# Patient Record
Sex: FEMALE | Race: WHITE | NOT HISPANIC OR LATINO | Employment: FULL TIME | URBAN - METROPOLITAN AREA
[De-identification: names, ages, dates, MRNs, and addresses within clinical notes are randomized per-mention and may not be internally consistent; named-entity substitution may affect disease eponyms.]

---

## 2018-10-11 PROBLEM — Z95.828 PRESENCE OF IVC FILTER: Status: ACTIVE | Noted: 2018-10-11

## 2018-10-11 PROBLEM — N17.9 AKI (ACUTE KIDNEY INJURY): Status: ACTIVE | Noted: 2018-10-11

## 2018-10-11 PROBLEM — D64.9 SYMPTOMATIC ANEMIA: Status: ACTIVE | Noted: 2018-10-11

## 2018-10-11 PROBLEM — R79.89 POSITIVE D DIMER: Status: ACTIVE | Noted: 2018-10-11

## 2018-10-11 PROBLEM — Z86.711 HX PULMONARY EMBOLISM: Status: ACTIVE | Noted: 2018-10-11

## 2018-10-12 PROBLEM — I16.0 HYPERTENSIVE URGENCY: Status: ACTIVE | Noted: 2018-10-12

## 2018-10-12 PROBLEM — R60.0 BILATERAL LOWER EXTREMITY EDEMA: Status: ACTIVE | Noted: 2018-10-12

## 2018-10-13 PROBLEM — R60.0 BILATERAL LOWER EXTREMITY EDEMA: Status: RESOLVED | Noted: 2018-10-12 | Resolved: 2018-10-13

## 2018-10-13 PROBLEM — N17.9 AKI (ACUTE KIDNEY INJURY): Status: RESOLVED | Noted: 2018-10-11 | Resolved: 2018-10-13

## 2018-10-13 PROBLEM — D64.9 SYMPTOMATIC ANEMIA: Status: RESOLVED | Noted: 2018-10-11 | Resolved: 2018-10-13

## 2018-11-09 ENCOUNTER — OFFICE VISIT (OUTPATIENT)
Dept: PRIMARY CARE CLINIC | Facility: CLINIC | Age: 63
End: 2018-11-09
Payer: COMMERCIAL

## 2018-11-09 VITALS
OXYGEN SATURATION: 98 % | SYSTOLIC BLOOD PRESSURE: 126 MMHG | WEIGHT: 245 LBS | RESPIRATION RATE: 18 BRPM | HEIGHT: 62 IN | HEART RATE: 88 BPM | BODY MASS INDEX: 45.08 KG/M2 | DIASTOLIC BLOOD PRESSURE: 81 MMHG

## 2018-11-09 DIAGNOSIS — K59.00 CONSTIPATION, UNSPECIFIED CONSTIPATION TYPE: ICD-10-CM

## 2018-11-09 DIAGNOSIS — E53.8 FOLIC ACID DEFICIENCY: ICD-10-CM

## 2018-11-09 DIAGNOSIS — D64.9 ANEMIA, UNSPECIFIED TYPE: Primary | ICD-10-CM

## 2018-11-09 DIAGNOSIS — I10 HYPERTENSION, UNSPECIFIED TYPE: ICD-10-CM

## 2018-11-09 DIAGNOSIS — Z86.39 HISTORY OF IRON DEFICIENCY: ICD-10-CM

## 2018-11-09 DIAGNOSIS — E66.01 MORBID OBESITY WITH BMI OF 40.0-44.9, ADULT: ICD-10-CM

## 2018-11-09 PROCEDURE — 99999 PR PBB SHADOW E&M-NEW PATIENT-LVL IV: CPT | Mod: PBBFAC,,, | Performed by: FAMILY MEDICINE

## 2018-11-09 PROCEDURE — 3008F BODY MASS INDEX DOCD: CPT | Mod: CPTII,S$GLB,, | Performed by: FAMILY MEDICINE

## 2018-11-09 PROCEDURE — 99204 OFFICE O/P NEW MOD 45 MIN: CPT | Mod: S$GLB,,, | Performed by: FAMILY MEDICINE

## 2018-11-09 RX ORDER — FOLIC ACID 1 MG/1
1 TABLET ORAL DAILY
Qty: 100 TABLET | Refills: 3 | Status: SHIPPED | OUTPATIENT
Start: 2018-11-09 | End: 2019-11-09

## 2018-11-09 RX ORDER — TRAZODONE HYDROCHLORIDE 50 MG/1
50 TABLET ORAL NIGHTLY
Qty: 30 TABLET | Refills: 11 | Status: SHIPPED | OUTPATIENT
Start: 2018-11-09 | End: 2019-11-26 | Stop reason: SDUPTHER

## 2018-11-09 RX ORDER — LORAZEPAM 0.5 MG/1
TABLET ORAL
Qty: 30 TABLET | Refills: 2 | Status: SHIPPED | OUTPATIENT
Start: 2018-11-09

## 2018-11-09 RX ORDER — LACTULOSE 10 G/15ML
20 SOLUTION ORAL DAILY
Qty: 900 ML | Refills: 0 | Status: SHIPPED | OUTPATIENT
Start: 2018-11-09 | End: 2018-12-09

## 2018-11-09 NOTE — PROGRESS NOTES
Subjective:       Patient ID: Tami Herrera is a 63 y.o. female.    Chief Complaint: Establish Care and Hospital Follow Up    HPI:  63 year female recently hospitalized--October 11th--kept for 2 days-- the went to Urgent Care due to sinus infection and ear infection.  Took an x-ray and told patient she was in congestive heart failure but she was not.  When patient got to the emergency room CBC was done and hemoglobin was 6.  Patient was transfused 2 units of blood.  Patient with history of endometrial cancer, history of a pulmonary embolus when doing all to bleeding, was placed on blood thinners had a hemoglobin of 4 in the past.  Never saw heme oncologist. Dr Ochoa saw pt was unable get appt--just had lab Wednesday in the hospital.       Patient had history of anemia in the past in the emergency room hemoglobin 6.3 M adequate 21.6 transfused 2 units of blood hemoglobin 9.7 hematocrit 31.7 now platelets return to normal, initially had some increased renal parameters after transfusion return to normal stool guaiac negative vitamin B12 normal folic acid low 5.73 hemoglobin A1c 5.4 cholesterol 192 better of 180  better of 106 better if 100 but has an excellent HDL at 57 sed rate 22.  Unable to locate serum iron and total iron-binding capacity levels.No EKG        Patient having trouble sleeping was supposed to get trazodone, on iron having problems with constipation and use MiraLax in the past--has not use lactulose or linvess      Does have some problems with anxiety.      Echocardiogram within normal limits only minimal changes    ROS:  Skin: no psoriasis, eczema, skin cancer  HEENT: No headache, ocular pain, blurred vision, diplopia, epistaxis, hoarseness change in voice, thyroid trouble  Lung: No pneumonia, asthma, Tb, wheezing, SOB, no smoking  Heart: No chest pain, ankle edema, palpitations, MI, + finn murmur,+ hypertension, no  Hyperlipidemia--echocardiogram basically within normal limits  Abdomen: No  nausea, vomiting, diarrhea, constipation, ulcers, hepatitis, gallbladder disease, melena, hematochezia, hematemesis  : no UTI, renal disease, stones  GYN hyst , last mammogram 2 years ago  MS: no fractures, O/A, lupus, rheumatoid, gout  Neuro: No dizziness, LOC, seizures   No diabetes, + anemia, + anxiety, no depression--some difficulty sleeping had suggested trazodone prior to discharge but never got a prescription  Single, no children, work [employer] and helps brother , lives alone with a dog.     Objective:   Physical Exam:  General: Well nourished, well developed, no acute distress Obesity  Skin: No lesions  HEENT: Eyes PERRLA, EOM intact, nose patent, throat non-erythematous ears TMs clear minimal bulging some clear fluid history allergy  NECK: Supple, no bruits, No JVD, no nodes  Lungs: Clear, no rales, rhonchi, wheezing  Heart: Regular rate and rhythm, no gallops, or rubs +heart murmur  Abdomen: flat, bowel sounds positive, no tenderness, or organomegaly  MS: Range of motion and muscle strength intact  Neuro: Alert, CN intact, oriented X 3  Extremities: No cyanosis, clubbing, or edema         Assessment:       1. Anemia, unspecified type    2. Folic acid deficiency    3. History of iron deficiency    4. Hypertension, unspecified type    5. Morbid obesity with BMI of 40.0-44.9, adult        Plan:       Anemia, unspecified type    Folic acid deficiency    History of iron deficiency    Hypertension, unspecified type    Morbid obesity with BMI of 40.0-44.9, adult      patient not have EKG in the hospital will do one now  Review of lab with patient showed hemoglobin 6.3 increase 9.7 hematocrit 21.6 increase 31.7 after 2 units of blood--patient is never seen a heme oncologist will refer to Dr. Horne  Will review lab unable to find serum iron or total iron binding capacity may due today  Redo CBC CMP in 6 weeks and 3 months to monitor level  Suggested colonoscopy patient wants to do a stool  for blood  Folic acid deficiency 5.73 will start on folic acid 1 mg  Patient continue iron if having trouble with constipation consider lactulose 1 tbsp q.d.  Patient states has lost 20 lb had weight loss surgery 2014--needs to continue to decrease weight  Trazodone 50 mg q.h.s. Ativan .5 mg 1 p.o. q.d. p.r.n. anxiety for if unable to get to sleep with trazodone could add Ativan if needed should not be taken daily but p.r.n.  Patient works at [employer]  Patient needs CBCs CMP in 6 weeks then q.3 months x4 to monitor anemia

## 2018-12-11 PROBLEM — D50.9 MICROCYTIC ANEMIA: Status: ACTIVE | Noted: 2018-12-11

## 2018-12-12 ENCOUNTER — OFFICE VISIT (OUTPATIENT)
Dept: HEMATOLOGY/ONCOLOGY | Facility: CLINIC | Age: 63
End: 2018-12-12
Payer: COMMERCIAL

## 2018-12-12 ENCOUNTER — TELEPHONE (OUTPATIENT)
Dept: PRIMARY CARE CLINIC | Facility: CLINIC | Age: 63
End: 2018-12-12

## 2018-12-12 VITALS
TEMPERATURE: 99 F | DIASTOLIC BLOOD PRESSURE: 93 MMHG | BODY MASS INDEX: 47.91 KG/M2 | SYSTOLIC BLOOD PRESSURE: 153 MMHG | WEIGHT: 244 LBS | HEART RATE: 87 BPM | HEIGHT: 60 IN | RESPIRATION RATE: 20 BRPM

## 2018-12-12 DIAGNOSIS — D50.9 MICROCYTIC ANEMIA: ICD-10-CM

## 2018-12-12 DIAGNOSIS — D64.9 ANEMIA, UNSPECIFIED TYPE: ICD-10-CM

## 2018-12-12 DIAGNOSIS — Z86.711 HX PULMONARY EMBOLISM: Primary | ICD-10-CM

## 2018-12-12 DIAGNOSIS — Z95.828 PRESENCE OF IVC FILTER: ICD-10-CM

## 2018-12-12 DIAGNOSIS — E53.8 FOLIC ACID DEFICIENCY: ICD-10-CM

## 2018-12-12 DIAGNOSIS — Z86.39 HISTORY OF IRON DEFICIENCY: ICD-10-CM

## 2018-12-12 PROCEDURE — 3008F BODY MASS INDEX DOCD: CPT | Mod: ,,, | Performed by: INTERNAL MEDICINE

## 2018-12-12 PROCEDURE — 99203 OFFICE O/P NEW LOW 30 MIN: CPT | Mod: ,,, | Performed by: INTERNAL MEDICINE

## 2018-12-12 RX ORDER — LACTULOSE 10 G/15ML
SOLUTION ORAL; RECTAL
Refills: 5 | COMMUNITY
Start: 2018-11-09

## 2018-12-12 RX ORDER — IRON,CARBONYL/ASCORBIC ACID 100-250 MG
1 TABLET ORAL DAILY
Qty: 30 EACH | Refills: 3 | Status: SHIPPED | OUTPATIENT
Start: 2018-12-12 | End: 2019-04-30 | Stop reason: SDUPTHER

## 2018-12-12 NOTE — TELEPHONE ENCOUNTER
----- Message from Maliha Gillespie sent at 12/12/2018  9:32 AM CST -----    Pt  Is  Requesting  Her  Ins  Info  Be faxed over to  Dr anguiano    Fax [phone]

## 2018-12-12 NOTE — PROGRESS NOTES
[facility] Hematolgy/Oncology  History & Physical    Subjective:      Patient ID:   NAME: Tami Herrera : 1955     63 y.o. female    Referring Doc: Renato Nova MD  Other Physicians:         Chief Complaint: anemia      HPI:  63 y.o. female with diagnosis of chronic anemia with iron deficiency who has been referred by Renato Nova MD for evaluation by medical hematology. She also has history of prior pulmonary emboli and folic acid deficiency. She is here by herself. She is retired from [employer]. She ambulates with use of cane. She has had anemia for most of her life. She had history of endometrial cancer in  and she required surgery. She had pulmonary emboli in . She denies any current CP, SOB, HA's or N/V. She denies any lack of energy or fatigue. She is on OTC iron supplements and folic acid. She takes her iron every other day because of stomach issues. She denies any BRBPR or dark stools. She has never had a colonoscopy before. She was recently hospitalized in Oct 2018 with sinus infection. She required blood transfusion at the most recent admission.               ROS:   GEN: normal without any fever, night sweats or weight loss  HEENT: normal with no HA's, sore throat, stiff neck, changes in vision  CV: normal with no CP, SOB, PND, ESQUIVEL or orthopnea  PULM: normal with no SOB, cough, hemoptysis, sputum or pleuritic pain  GI: normal with no abdominal pain, nausea, vomiting, constipation, diarrhea, melanotic stools, BRBPR, or hematemesis  : normal with no hematuria, dysuria  BREAST: normal with no mass, discharge, pain  SKIN: normal with no rash, erythema, bruising, or swelling       Past Medical/Surgical History:  Past Medical History:   Diagnosis Date    Anemia     Endometrial cancer     History of endometrial cancer     Hx pulmonary embolism     Knee pain, bilateral     Microcytic anemia 2018    Obesity     S/P IVC filter     Sciatica      Past Surgical History:    Procedure Laterality Date    gastric sleeve      HYSTERECTOMY      TONSILLECTOMY           Allergies:  Review of patient's allergies indicates:   Allergen Reactions    Amoxicillin Rash    Pcn [penicillins] Hives       Social/Family History:  Social History     Socioeconomic History    Marital status: Single     Spouse name: Not on file    Number of children: Not on file    Years of education: Not on file    Highest education level: Not on file   Social Needs    Financial resource strain: Not on file    Food insecurity - worry: Not on file    Food insecurity - inability: Not on file    Transportation needs - medical: Not on file    Transportation needs - non-medical: Not on file   Occupational History    Not on file   Tobacco Use    Smoking status: Never Smoker    Smokeless tobacco: Never Used   Substance and Sexual Activity    Alcohol use: No    Drug use: No    Sexual activity: Not on file   Other Topics Concern    Not on file   Social History Narrative    Not on file     History reviewed. No pertinent family history.      Medications:    Current Outpatient Medications:     folic acid (FOLVITE) 1 MG tablet, Take 1 tablet (1 mg total) by mouth once daily., Disp: 100 tablet, Rfl: 3    lactulose (CHRONULAC) 10 gram/15 mL solution, TK 15 MLS PO D CAN INCREASE TO 30 MLS IF NO RELIEF, Disp: , Rfl: 5    LORazepam (ATIVAN) 0.5 MG tablet, 1 po qd prn anxiety or may add to trazadoen prn sleep, Disp: 30 tablet, Rfl: 2    losartan-hydrochlorothiazide 100-12.5 mg (HYZAAR) 100-12.5 mg Tab, Take 1 tablet by mouth once daily., Disp: 90 tablet, Rfl: 3    omeprazole (PRILOSEC) 20 MG capsule, Take 2 capsules (40 mg total) by mouth once daily., Disp: 60 capsule, Rfl: 11    traZODone (DESYREL) 50 MG tablet, Take 1 tablet (50 mg total) by mouth every evening., Disp: 30 tablet, Rfl: 11      Pathology:  Cancer Staging  No matching staging information was found for the patient.      Objective:   Vitals:  Blood  pressure (!) 153/93, pulse 87, temperature 98.5 °F (36.9 °C), resp. rate 20, height 5' (1.524 m), weight 110.7 kg (244 lb).    Physical Examination:   GEN: no apparent distress, comfortable; AAOx3; overweight  HEAD: atraumatic and normocephalic  EYES: no pallor, no icterus, PERRLA  ENT: OMM, no pharyngeal erythema, external ears WNL; no nasal discharge; no thrush  NECK: no masses, thyroid normal, trachea midline, no LAD/LN's, supple  CV: RRR with no murmur; normal pulse; normal S1 and S2; no pedal edema  CHEST: Normal respiratory effort; CTAB; normal breath sounds; no wheeze or crackles  ABDOM: nontender and nondistended; soft; normal bowel sounds; no rebound/guarding  MUSC/Skeletal: ROM normal; no crepitus; joints normal; no deformities or arthropathy; she walks with use of cane  EXTREM: no clubbing, cyanosis, inflammation or swelling  SKIN: no rashes, lesions, ulcers, petechiae or subcutaneous nodules  : no jo  NEURO: grossly intact; motor/sensory WNL; AAOx3; no tremors  PSYCH: normal mood, affect and behavior  LYMPH: normal cervical, supraclavicular, axillary and groin LN's      Labs:   Lab Results   Component Value Date    WBC 9.00 11/09/2018    HGB 10.0 (L) 11/09/2018    HCT 31.8 (L) 11/09/2018    MCV 67 (L) 11/09/2018     (H) 11/09/2018    CMP  Sodium   Date Value Ref Range Status   11/09/2018 138 136 - 145 mmol/L Final     Potassium   Date Value Ref Range Status   11/09/2018 3.7 3.5 - 5.1 mmol/L Final     Chloride   Date Value Ref Range Status   11/09/2018 102 101 - 111 mmol/L Final     CO2   Date Value Ref Range Status   11/09/2018 27 23 - 29 mmol/L Final     Glucose   Date Value Ref Range Status   11/09/2018 104 74 - 118 mg/dL Final     BUN, Bld   Date Value Ref Range Status   11/09/2018 35 (H) 8 - 23 mg/dL Final     Creatinine   Date Value Ref Range Status   11/09/2018 1.2 0.5 - 1.4 mg/dL Final     Calcium   Date Value Ref Range Status   11/09/2018 9.1 8.6 - 10.0 mg/dL Final     Total Protein    Date Value Ref Range Status   11/09/2018 7.5 6.0 - 8.4 g/dL Final     Albumin   Date Value Ref Range Status   11/09/2018 4.1 3.5 - 5.2 g/dL Final     Total Bilirubin   Date Value Ref Range Status   11/09/2018 0.6 0.3 - 1.2 mg/dL Final     Comment:     For infants and newborns, interpretation of results should be based  on gestational age, weight and in agreement with clinical  observations.  Premature Infant recommended reference ranges:  Up to 24 hours.............<8.0 mg/dL  Up to 48 hours............<12.0 mg/dL  3-5 days..................<15.0 mg/dL  6-29 days.................<15.0 mg/dL       Alkaline Phosphatase   Date Value Ref Range Status   11/09/2018 102 38 - 126 U/L Final     AST   Date Value Ref Range Status   11/09/2018 21 15 - 41 U/L Final     ALT   Date Value Ref Range Status   11/09/2018 16 14 - 54 U/L Final     Anion Gap   Date Value Ref Range Status   11/09/2018 9 8 - 16 mmol/L Final     eGFR if    Date Value Ref Range Status   11/09/2018 56.4 (A) >60 mL/min/1.73 m^2 Final     eGFR if non    Date Value Ref Range Status   11/09/2018 48.9 (A) >60 mL/min/1.73 m^2 Final     Comment:     Calculation used to obtain the estimated glomerular filtration  rate (eGFR) is the CKD-EPI equation.        Lab Results   Component Value Date    IRON 96 11/09/2018    TIBC 487 (H) 11/09/2018     CEA 0.0 - 5.0 ng/mL 2.1     Vitamin B-12 180 - 914 pg/mL 439      Folate >=6.59 ng/mL 5.73         Radiology/Diagnostic Studies:          All lab results and imaging results have been reviewed and discussed with the patient    Assessment:   (1) 63 y.o. female with diagnosis of chronic anemia with iron deficiency and folate deficiency referred by Dr Nova for hematologic evaluation  - chronic history of anemia requiring transfusions in past  - TIBC elevated  - microcytic indices  - bilirubin wnl so I do not suspect hemolysis at this time  - hgb currently at 10  - she required recent blood  transfusion in Oct 2018    (2) Hx/of pulmonary emboli and s/p IVC filter in past - 2009    (3) HTN    (4) Obesity    (5) Hx/of endometrial cancer - 2009 - s/p radiation but no chemotherapy; she does not f/u with a GYN        VISIT DIAGNOSES:              Hx pulmonary embolism    Presence of IVC filter    Anemia, unspecified type    History of iron deficiency    Folic acid deficiency    Microcytic anemia            Plan:     PLAN:  1. Change to ICAR-C po daily if tolerable  2. She needs to see GI - she needs endoscopy - refer her to Dr Domingo  3. If the ICAR is intolerable, then will consider IV iron  4. F/u with PCP  5. Check labs monthly  6. Check ferritin, hgb electrophoresis  RTC in 4 weeks   Fax note to Renato Nova MD,        I have explained and the patient understands all of  the current recommendation(s). I have answered all of their questions to the best of my ability and to their complete satisfaction.             Thank you for allowing me to participate in this patient's care. Please call with any questions or concerns.    Electronically signed Jeremy Horne MD

## 2018-12-21 ENCOUNTER — TELEPHONE (OUTPATIENT)
Dept: HEMATOLOGY/ONCOLOGY | Facility: CLINIC | Age: 63
End: 2018-12-21

## 2018-12-21 NOTE — TELEPHONE ENCOUNTER
Call # 1 - 12/18/18 @ 8:17am - left voicemail letting pt know that as per her request I had cancelled the apt with Dr. Domingo and wanted to know if she needed me to send a referral to the GI doctor she had requested.     Call # 2- 12/21/18 @ 11:39am- no answer- left voicemail # 2 asking the pt if I needed to send any records to the new GI doctor.

## 2019-01-07 ENCOUNTER — TELEPHONE (OUTPATIENT)
Dept: PRIMARY CARE CLINIC | Facility: CLINIC | Age: 64
End: 2019-01-07

## 2019-01-07 NOTE — TELEPHONE ENCOUNTER
----- Message from Abdiel Curran sent at 1/7/2019  1:35 PM CST -----  Contact: self   Patient miss call from your office please call back at [phone] (yrsg)

## 2019-01-07 NOTE — TELEPHONE ENCOUNTER
----- Message from Maliha Gillespie sent at 1/7/2019 10:01 AM CST -----   Type:  RX Refill Request    Who Called:  pt   New Rx:    RX Name and Strength:    Pt  Had  Recalled  Blood  Pressure  meds  And  Needs  Something  In  Its  Place   Preferred Pharmacy with phone number:    [phone] Fax: [phone]    [facility] [address] [place] [address] [facility] - [address] AT [facility] [address]  [address] [place] [address]  Phone: [phone] Fax: [phone]  [facility]  Best Call Back Number: [phone]

## 2019-01-14 ENCOUNTER — TELEPHONE (OUTPATIENT)
Dept: HEMATOLOGY/ONCOLOGY | Facility: CLINIC | Age: 64
End: 2019-01-14

## 2019-01-14 ENCOUNTER — OFFICE VISIT (OUTPATIENT)
Dept: HEMATOLOGY/ONCOLOGY | Facility: CLINIC | Age: 64
End: 2019-01-14
Payer: COMMERCIAL

## 2019-01-14 VITALS
HEART RATE: 94 BPM | SYSTOLIC BLOOD PRESSURE: 136 MMHG | TEMPERATURE: 98 F | DIASTOLIC BLOOD PRESSURE: 91 MMHG | RESPIRATION RATE: 20 BRPM | WEIGHT: 246.19 LBS | BODY MASS INDEX: 48.08 KG/M2

## 2019-01-14 DIAGNOSIS — Z95.828 PRESENCE OF IVC FILTER: ICD-10-CM

## 2019-01-14 DIAGNOSIS — Z86.711 HX PULMONARY EMBOLISM: Primary | ICD-10-CM

## 2019-01-14 DIAGNOSIS — Z86.39 HISTORY OF IRON DEFICIENCY: ICD-10-CM

## 2019-01-14 DIAGNOSIS — D64.9 ANEMIA, UNSPECIFIED TYPE: ICD-10-CM

## 2019-01-14 DIAGNOSIS — D50.9 MICROCYTIC ANEMIA: ICD-10-CM

## 2019-01-14 DIAGNOSIS — E53.8 FOLIC ACID DEFICIENCY: ICD-10-CM

## 2019-01-14 PROCEDURE — 99215 OFFICE O/P EST HI 40 MIN: CPT | Mod: ,,, | Performed by: INTERNAL MEDICINE

## 2019-01-14 PROCEDURE — 3075F SYST BP GE 130 - 139MM HG: CPT | Mod: ,,, | Performed by: INTERNAL MEDICINE

## 2019-01-14 PROCEDURE — 3008F PR BODY MASS INDEX (BMI) DOCUMENTED: ICD-10-PCS | Mod: ,,, | Performed by: INTERNAL MEDICINE

## 2019-01-14 PROCEDURE — 3075F PR MOST RECENT SYSTOLIC BLOOD PRESS GE 130-139MM HG: ICD-10-PCS | Mod: ,,, | Performed by: INTERNAL MEDICINE

## 2019-01-14 PROCEDURE — 99215 PR OFFICE/OUTPT VISIT, EST, LEVL V, 40-54 MIN: ICD-10-PCS | Mod: ,,, | Performed by: INTERNAL MEDICINE

## 2019-01-14 PROCEDURE — 3008F BODY MASS INDEX DOCD: CPT | Mod: ,,, | Performed by: INTERNAL MEDICINE

## 2019-01-14 PROCEDURE — 3080F DIAST BP >= 90 MM HG: CPT | Mod: ,,, | Performed by: INTERNAL MEDICINE

## 2019-01-14 PROCEDURE — 3080F PR MOST RECENT DIASTOLIC BLOOD PRESSURE >= 90 MM HG: ICD-10-PCS | Mod: ,,, | Performed by: INTERNAL MEDICINE

## 2019-01-14 NOTE — TELEPHONE ENCOUNTER
Isamar has taken care of this     ----- Message from Mee Pires sent at 1/11/2019 11:37 AM CST -----  The patient called and stated that Dr Horne had referred her to Dr Domingo a gastointerologist and she does not want to go to him. She would like a referral to be sent to Dr Evangelina Rg. She said she spoke to their office and they want us to fax them a referral with all the patient's pertinent information. The phone number to the clinic is [phone] and the fax number is [phone]. Please let the patient know when this is done at [phone]

## 2019-01-14 NOTE — LETTER
January 14, 2019      Renato Nova MD  [address] [place] [address]           [facility] - Hematology Oncology  [address]  [address]  [facility] [address]  Phone: [phone]  Fax: [phone]          Patient: Tami Herrera   MR Number: [id number]   YOB: 1955   Date of Visit: 1/14/2019       Dear Dr. Renato Nova:    Thank you for referring Tami Herrera to me for evaluation. Attached you will find relevant portions of my assessment and plan of care.    If you have questions, please do not hesitate to call me. I look forward to following Tami Herrera along with you.    Sincerely,    Jeremy Horne MD    Enclosure  CC:  No Recipients    If you would like to receive this communication electronically, please contact [email or web address] or [phone] to request more information on [employer] Link access.    For providers and/or their staff who would like to refer a patient to [facility], please contact us through our one-stop-shop provider referral line, [facility], at [phone].    If you feel you have received this communication in error or would no longer like to receive these types of communications, please e-mail [email or web address]

## 2019-02-16 RX ORDER — LORAZEPAM 0.5 MG/1
TABLET ORAL
Qty: 30 TABLET | Refills: 0 | OUTPATIENT
Start: 2019-02-16

## 2019-02-21 ENCOUNTER — TELEPHONE (OUTPATIENT)
Dept: HEMATOLOGY/ONCOLOGY | Facility: CLINIC | Age: 64
End: 2019-02-21

## 2019-02-27 ENCOUNTER — TELEPHONE (OUTPATIENT)
Dept: HEMATOLOGY/ONCOLOGY | Facility: CLINIC | Age: 64
End: 2019-02-27

## 2019-02-27 NOTE — TELEPHONE ENCOUNTER
Given to remberto to schedule     ----- Message from Hodan Patterson sent at 2/27/2019 11:24 AM CST -----  Contact: from patient portal  With Provider: Jeremy Horne MD [[facility] - Hematology Oncology]    Preferred Date Range: 3/11/2019 - 3/11/2019    Preferred Times: Monday Morning    Reason for visit: Existing Patient    Comments:  follow up from appointment that was cancelled on 2/26

## 2019-03-29 ENCOUNTER — TELEPHONE (OUTPATIENT)
Dept: HEMATOLOGY/ONCOLOGY | Facility: CLINIC | Age: 64
End: 2019-03-29

## 2019-03-29 NOTE — TELEPHONE ENCOUNTER
Called to see if the pt had any labs done prior to f/u appt.    LM for the pt to do the labs at [facility].

## 2019-04-30 DIAGNOSIS — E53.8 FOLIC ACID DEFICIENCY: ICD-10-CM

## 2019-04-30 DIAGNOSIS — Z86.39 HISTORY OF IRON DEFICIENCY: ICD-10-CM

## 2019-04-30 DIAGNOSIS — D50.9 MICROCYTIC ANEMIA: ICD-10-CM

## 2019-04-30 RX ORDER — IRON,CARBONYL/ASCORBIC ACID 100-250 MG
TABLET ORAL
Qty: 30 TABLET | Refills: 0 | Status: SHIPPED | OUTPATIENT
Start: 2019-04-30 | End: 2019-09-13 | Stop reason: SDUPTHER

## 2019-05-08 ENCOUNTER — TELEPHONE (OUTPATIENT)
Dept: HEMATOLOGY/ONCOLOGY | Facility: CLINIC | Age: 64
End: 2019-05-08

## 2019-05-08 NOTE — TELEPHONE ENCOUNTER
Called to see if the pt had any labs done prior to f/u appt.    Lm for the labs to be done @ [facility].

## 2019-05-13 ENCOUNTER — OFFICE VISIT (OUTPATIENT)
Dept: HEMATOLOGY/ONCOLOGY | Facility: CLINIC | Age: 64
End: 2019-05-13
Payer: COMMERCIAL

## 2019-05-13 VITALS
BODY MASS INDEX: 47.28 KG/M2 | WEIGHT: 242.13 LBS | DIASTOLIC BLOOD PRESSURE: 85 MMHG | TEMPERATURE: 99 F | HEART RATE: 71 BPM | RESPIRATION RATE: 20 BRPM | SYSTOLIC BLOOD PRESSURE: 140 MMHG

## 2019-05-13 DIAGNOSIS — Z86.39 HISTORY OF IRON DEFICIENCY: ICD-10-CM

## 2019-05-13 DIAGNOSIS — Z86.711 HX PULMONARY EMBOLISM: ICD-10-CM

## 2019-05-13 DIAGNOSIS — E53.8 FOLIC ACID DEFICIENCY: ICD-10-CM

## 2019-05-13 DIAGNOSIS — Z95.828 PRESENCE OF IVC FILTER: Primary | ICD-10-CM

## 2019-05-13 DIAGNOSIS — D50.9 MICROCYTIC ANEMIA: ICD-10-CM

## 2019-05-13 DIAGNOSIS — D64.9 ANEMIA, UNSPECIFIED TYPE: ICD-10-CM

## 2019-05-13 PROCEDURE — 3079F DIAST BP 80-89 MM HG: CPT | Mod: ,,, | Performed by: INTERNAL MEDICINE

## 2019-05-13 PROCEDURE — 3079F PR MOST RECENT DIASTOLIC BLOOD PRESSURE 80-89 MM HG: ICD-10-PCS | Mod: ,,, | Performed by: INTERNAL MEDICINE

## 2019-05-13 PROCEDURE — 3008F BODY MASS INDEX DOCD: CPT | Mod: ,,, | Performed by: INTERNAL MEDICINE

## 2019-05-13 PROCEDURE — 3077F PR MOST RECENT SYSTOLIC BLOOD PRESSURE >= 140 MM HG: ICD-10-PCS | Mod: ,,, | Performed by: INTERNAL MEDICINE

## 2019-05-13 PROCEDURE — 99213 PR OFFICE/OUTPT VISIT, EST, LEVL III, 20-29 MIN: ICD-10-PCS | Mod: ,,, | Performed by: INTERNAL MEDICINE

## 2019-05-13 PROCEDURE — 3008F PR BODY MASS INDEX (BMI) DOCUMENTED: ICD-10-PCS | Mod: ,,, | Performed by: INTERNAL MEDICINE

## 2019-05-13 PROCEDURE — 3077F SYST BP >= 140 MM HG: CPT | Mod: ,,, | Performed by: INTERNAL MEDICINE

## 2019-05-13 PROCEDURE — 99213 OFFICE O/P EST LOW 20 MIN: CPT | Mod: ,,, | Performed by: INTERNAL MEDICINE

## 2019-05-13 NOTE — LETTER
May 13, 2019      Hannah Rouse NP  [address]  [address]  [place] [address]           [facility] - Hematology Oncology  [address]  [place] [address]  Phone: [phone]  Fax: [phone]          Patient: Tami Herrera   MR Number: [id number]   YOB: 1955   Date of Visit: 5/13/2019       Dear Hannah Rouse:    Thank you for referring Tami Herrera to me for evaluation. Attached you will find relevant portions of my assessment and plan of care.    If you have questions, please do not hesitate to call me. I look forward to following Tami Herrera along with you.    Sincerely,    Jeremy Horne MD    Enclosure  CC:  No Recipients    If you would like to receive this communication electronically, please contact [email or web address] or [phone] to request more information on [employer] Link access.    For providers and/or their staff who would like to refer a patient to [facility], please contact us through our one-stop-shop provider referral line, [facility] , at [phone].    If you feel you have received this communication in error or would no longer like to receive these types of communications, please e-mail [email or web address]

## 2019-05-13 NOTE — PROGRESS NOTES
[facility] Hematology/Oncology  PROGRESS NOTE -  Follow-up Visit      Subjective:       Patient ID:   NAME: Tami Herrera : 1955     63 y.o. female    Referring Doc: Jesusita Rouse NP  Other Physicians: Evangelina Rg,     Chief Complaint:  Anemia f/u    History of Present Illness:     Patient returns today for a regularly scheduled follow-up visit.  The patient is here today to go over the results of the recently ordered labs, tests and studies. She did not see Dr Evangelina Rg  With GI as of yet. She is here by herself. She reports that she feels fine. She denies any CP, SOB, HA's or N/V.           ROS:   GEN: normal without any fever, night sweats or weight loss  HEENT: normal with no HA's, sore throat, stiff neck, changes in vision  CV: normal with no CP, SOB, PND, ESQUIVEL or orthopnea  PULM: normal with no SOB, cough, hemoptysis, sputum or pleuritic pain  GI: normal with no abdominal pain, nausea, vomiting, constipation, diarrhea, melanotic stools, BRBPR, or hematemesis  : normal with no hematuria, dysuria  BREAST: normal with no mass, discharge, pain  SKIN: normal with no rash, erythema, bruising, or swelling    Allergies:  Review of patient's allergies indicates:   Allergen Reactions    Amoxicillin Rash    Pcn [penicillins] Hives       Medications:    Current Outpatient Medications:     folic acid (FOLVITE) 1 MG tablet, Take 1 tablet (1 mg total) by mouth once daily., Disp: 100 tablet, Rfl: 3    iron-vitamin C 100-250 mg, ICAR-C, 100-250 mg Tab, TAKE 1 TABLET BY MOUTH EVERY DAY, Disp: 30 tablet, Rfl: 0    lactulose (CHRONULAC) 10 gram/15 mL solution, TK 15 MLS PO D CAN INCREASE TO 30 MLS IF NO RELIEF, Disp: , Rfl: 5    LORazepam (ATIVAN) 0.5 MG tablet, 1 po qd prn anxiety or may add to trazadoen prn sleep, Disp: 30 tablet, Rfl: 2    losartan-hydrochlorothiazide 100-12.5 mg (HYZAAR) 100-12.5 mg Tab, Take 1 tablet by mouth once daily., Disp: 90 tablet, Rfl: 3    omeprazole (PRILOSEC) 20 MG capsule, Take 2  capsules (40 mg total) by mouth once daily., Disp: 60 capsule, Rfl: 11    traZODone (DESYREL) 50 MG tablet, Take 1 tablet (50 mg total) by mouth every evening., Disp: 30 tablet, Rfl: 11    PMHx/PSHx Updates:  See patient's last visit with me on 1/14/2019.  See H&P on 12/12/2018        Pathology:  Cancer Staging  No matching staging information was found for the patient.          Objective:     Vitals:  Blood pressure (!) 140/85, pulse 71, temperature 98.8 °F (37.1 °C), temperature source Oral, resp. rate 20, weight 109.8 kg (242 lb 1.6 oz).    Physical Examination:   GEN: no apparent distress, comfortable; AAOx3; overweight  HEAD: atraumatic and normocephalic  EYES: no pallor, no icterus, PERRLA  ENT: OMM, no pharyngeal erythema, external ears WNL; no nasal discharge; no thrush  NECK: no masses, thyroid normal, trachea midline, no LAD/LN's, supple  CV: RRR with no murmur; normal pulse; normal S1 and S2; no pedal edema  CHEST: Normal respiratory effort; CTAB; normal breath sounds; no wheeze or crackles  ABDOM: nontender and nondistended; soft; normal bowel sounds; no rebound/guarding  MUSC/Skeletal: ROM normal; no crepitus; joints normal; no deformities or arthropathy; she walks with use of cane  EXTREM: no clubbing, cyanosis, inflammation or swelling  SKIN: no rashes, lesions, ulcers, petechiae or subcutaneous nodules  : no jo  NEURO: grossly intact; motor/sensory WNL; AAOx3; no tremors  PSYCH: normal mood, affect and behavior  LYMPH: normal cervical, supraclavicular, axillary and groin LN's              Labs:   5/9/2019  Lab Results   Component Value Date    WBC 8.80 05/09/2019    HGB 11.9 (L) 05/09/2019    HCT 36.5 (L) 05/09/2019    MCV 83 05/09/2019     05/09/2019     CMP  Sodium   Date Value Ref Range Status   05/09/2019 138 136 - 145 mmol/L Final     Potassium   Date Value Ref Range Status   05/09/2019 4.5 3.5 - 5.1 mmol/L Final     Chloride   Date Value Ref Range Status   05/09/2019 103 101 - 111  mmol/L Final     CO2   Date Value Ref Range Status   05/09/2019 26 23 - 29 mmol/L Final     Glucose   Date Value Ref Range Status   05/09/2019 100 74 - 118 mg/dL Final     BUN, Bld   Date Value Ref Range Status   05/09/2019 30 (H) 8 - 23 mg/dL Final     Creatinine   Date Value Ref Range Status   05/09/2019 1.1 0.5 - 1.4 mg/dL Final     Calcium   Date Value Ref Range Status   05/09/2019 8.8 8.6 - 10.0 mg/dL Final     Total Protein   Date Value Ref Range Status   05/09/2019 7.1 6.0 - 8.4 g/dL Final     Albumin   Date Value Ref Range Status   05/09/2019 4.0 3.5 - 5.2 g/dL Final     Total Bilirubin   Date Value Ref Range Status   05/09/2019 0.4 0.3 - 1.2 mg/dL Final     Comment:     For infants and newborns, interpretation of results should be based  on gestational age, weight and in agreement with clinical  observations.  Premature Infant recommended reference ranges:  Up to 24 hours.............<8.0 mg/dL  Up to 48 hours............<12.0 mg/dL  3-5 days..................<15.0 mg/dL  6-29 days.................<15.0 mg/dL       Alkaline Phosphatase   Date Value Ref Range Status   05/09/2019 83 38 - 126 U/L Final     AST   Date Value Ref Range Status   05/09/2019 17 15 - 41 U/L Final     ALT   Date Value Ref Range Status   05/09/2019 14 14 - 54 U/L Final     Anion Gap   Date Value Ref Range Status   05/09/2019 9 8 - 16 mmol/L Final     eGFR if    Date Value Ref Range Status   05/09/2019 >60.0 >60 mL/min/1.73 m^2 Final     eGFR if non    Date Value Ref Range Status   05/09/2019 54.3 (A) >60 mL/min/1.73 m^2 Final     Comment:     Calculation used to obtain the estimated glomerular filtration  rate (eGFR) is the CKD-EPI equation.          Lab Results   Component Value Date    IRON 72 05/09/2019    TIBC 366 05/09/2019    FERRITIN 18 (L) 05/09/2019       Lab Results   Component Value Date    [id number] 429 01/10/2019     Lab Results   Component Value Date    FOLATE >23.90 01/10/2019     hgb  electrophoresis pending      Radiology/Diagnostic Studies:    No results found.    I have reviewed all available lab results and radiology reports.    Assessment/Plan:   (1) 63 y.o. female  with diagnosis of chronic anemia with iron deficiency and folate deficiency referred by Dr Nova for hematologic evaluation  - chronic history of anemia requiring transfusions in past  - TIBC elevated  - microcytic indices  - bilirubin wnl so I do not suspect hemolysis at this time  - hgb currently at 11.9 and much better  - she required blood transfusion in Oct 2018  - ferritin is low at 18 but improved; iron panel is adequate  - she has been on ICAR-C    - she is to see GI Dr Rg in near future  - B12 and folate are adequate     (2) Hx/of pulmonary emboli and s/p IVC filter in past - 2009     (3) HTN     (4) Obesity     (5) Hx/of endometrial cancer - 2009 - s/p radiation but no chemotherapy; she does not f/u with a GYN              VISIT DIAGNOSES:      Presence of IVC filter    Hx pulmonary embolism    Microcytic anemia    Anemia, unspecified type    History of iron deficiency    Folic acid deficiency          PLAN:  1. Proceed with GI evaluation (encouraged compliance)  2. Continue ICAR-C  3. monitor labs every 3 months  4. Consider IV iron if the above oral does not seem to help improve her parameters; and pending GI evaluation  RTC in 6 months  Fax note to Hannah Rouse NP, Hannah Ayala NP, [place]    Discussion:       I spent over 25 mins of time with the patient. Reviewed results of the recently ordered labs, tests and studies; made directives with regards to the results. Over half of this time was spent couseling and coordinating care.    I have explained all of the above in detail and the patient understands all of the current recommendation(s). I have answered all of their questions to the best of my ability and to their complete satisfaction.   The patient is to continue with the current management  plan.            Electronically signed by Jeremy Horne MD

## 2019-09-13 DIAGNOSIS — Z86.39 HISTORY OF IRON DEFICIENCY: ICD-10-CM

## 2019-09-13 DIAGNOSIS — D50.9 MICROCYTIC ANEMIA: ICD-10-CM

## 2019-09-13 DIAGNOSIS — E53.8 FOLIC ACID DEFICIENCY: ICD-10-CM

## 2019-09-13 RX ORDER — IRON,CARBONYL/ASCORBIC ACID 100-250 MG
TABLET ORAL
Qty: 30 TABLET | Refills: 0 | Status: SHIPPED | OUTPATIENT
Start: 2019-09-13 | End: 2019-10-14 | Stop reason: SDUPTHER

## 2019-10-14 DIAGNOSIS — E53.8 FOLIC ACID DEFICIENCY: ICD-10-CM

## 2019-10-14 DIAGNOSIS — Z86.39 HISTORY OF IRON DEFICIENCY: ICD-10-CM

## 2019-10-14 DIAGNOSIS — D50.9 MICROCYTIC ANEMIA: ICD-10-CM

## 2019-10-14 RX ORDER — IRON,CARBONYL/ASCORBIC ACID 100-250 MG
TABLET ORAL
Qty: 30 TABLET | Refills: 0 | Status: SHIPPED | OUTPATIENT
Start: 2019-10-14

## 2019-11-08 ENCOUNTER — TELEPHONE (OUTPATIENT)
Dept: HEMATOLOGY/ONCOLOGY | Facility: CLINIC | Age: 64
End: 2019-11-08

## 2019-11-08 NOTE — TELEPHONE ENCOUNTER
Called to see if the pt had any labs done prior to f/u appt.    ARACELIS for the labs to be done @ [facility]

## 2019-12-03 RX ORDER — TRAZODONE HYDROCHLORIDE 50 MG/1
TABLET ORAL
Qty: 30 TABLET | Refills: 2 | Status: SHIPPED | OUTPATIENT
Start: 2019-12-03

## 2019-12-03 NOTE — TELEPHONE ENCOUNTER
Pt last seen by me Nov 2018 -- Needs lab yearly lab done May but due not seen over a year OK 3 mo refills give time come in getr labv getr seen

## 2020-01-11 RX ORDER — FOLIC ACID 1 MG/1
TABLET ORAL
Qty: 100 TABLET | Refills: 3 | OUTPATIENT
Start: 2020-01-11

## 2020-01-12 NOTE — TELEPHONE ENCOUNTER
Pt has noit seen me since June 2018 Needs come in fasting get seen get Lab get additional refills Could call Dr Wolff for refills if needs sooner

## 2021-04-26 ENCOUNTER — PATIENT MESSAGE (OUTPATIENT)
Dept: RESEARCH | Facility: HOSPITAL | Age: 66
End: 2021-04-26

## 2024-04-18 PROBLEM — Z98.890 H/O RIGHT KNEE SURGERY: Status: ACTIVE | Noted: 2024-04-18

## 2024-04-18 PROBLEM — S82.201A CLOSED FRACTURE OF SHAFT OF RIGHT TIBIA: Status: ACTIVE | Noted: 2024-04-18

## 2024-06-26 ENCOUNTER — TELEPHONE (OUTPATIENT)
Dept: HEPATOLOGY | Facility: CLINIC | Age: 69
End: 2024-06-26

## 2024-06-26 NOTE — TELEPHONE ENCOUNTER
----- Message from Veronica Rosales sent at 6/26/2024 12:35 PM CDT -----  Regarding: Pt Advice  Contact: [phone]  CONSULT/ADVISORY    Name of Caller:  DAVE CERNA [[id number]]    Contact Preference: [phone]    Nature of Call:  NP is requesting a call back stating she was referred to see Dr Rg for a blood disorder but would like more information.

## 2024-06-26 NOTE — TELEPHONE ENCOUNTER
The pt was informed this is the Liver department not Hematology and she verbalized her understanding.

## 2024-07-05 ENCOUNTER — OFFICE VISIT (OUTPATIENT)
Facility: CLINIC | Age: 69
End: 2024-07-05
Payer: MEDICARE

## 2024-07-05 VITALS
WEIGHT: 242.38 LBS | HEIGHT: 62 IN | HEART RATE: 77 BPM | SYSTOLIC BLOOD PRESSURE: 165 MMHG | DIASTOLIC BLOOD PRESSURE: 74 MMHG | TEMPERATURE: 97 F | RESPIRATION RATE: 20 BRPM | BODY MASS INDEX: 44.6 KG/M2

## 2024-07-05 DIAGNOSIS — Z86.711 HISTORY OF PULMONARY EMBOLUS (PE): ICD-10-CM

## 2024-07-05 DIAGNOSIS — N28.9 KIDNEY INSUFFICIENCY: ICD-10-CM

## 2024-07-05 DIAGNOSIS — Z85.42 HISTORY OF ENDOMETRIAL CANCER: ICD-10-CM

## 2024-07-05 DIAGNOSIS — D50.9 MICROCYTIC ANEMIA: ICD-10-CM

## 2024-07-05 DIAGNOSIS — D50.0 IRON DEFICIENCY ANEMIA DUE TO CHRONIC BLOOD LOSS: Primary | ICD-10-CM

## 2024-07-05 DIAGNOSIS — D52.0 DIETARY FOLATE DEFICIENCY ANEMIA: ICD-10-CM

## 2024-07-05 DIAGNOSIS — Z95.828 PRESENCE OF IVC FILTER: ICD-10-CM

## 2024-07-05 PROCEDURE — 99999 PR PBB SHADOW E&M-EST. PATIENT-LVL IV: CPT | Mod: PBBFAC,,, | Performed by: NURSE PRACTITIONER

## 2024-07-05 RX ORDER — DIPHENHYDRAMINE HYDROCHLORIDE 50 MG/ML
50 INJECTION INTRAMUSCULAR; INTRAVENOUS ONCE AS NEEDED
OUTPATIENT
Start: 2024-07-08

## 2024-07-05 RX ORDER — ACETAMINOPHEN 325 MG/1
650 TABLET ORAL
Start: 2024-07-08

## 2024-07-05 RX ORDER — EPINEPHRINE 0.3 MG/.3ML
0.3 INJECTION SUBCUTANEOUS ONCE AS NEEDED
OUTPATIENT
Start: 2024-07-08

## 2024-07-05 RX ORDER — HEPARIN 100 UNIT/ML
5 SYRINGE INTRAVENOUS
OUTPATIENT
Start: 2024-07-08

## 2024-07-05 RX ORDER — SODIUM CHLORIDE 9 MG/ML
INJECTION, SOLUTION INTRAVENOUS CONTINUOUS
OUTPATIENT
Start: 2024-07-08

## 2024-07-05 RX ORDER — IRON,CARBONYL/ASCORBIC ACID 100-250 MG
1 TABLET ORAL DAILY
Qty: 90 EACH | Refills: 3 | Status: SHIPPED | OUTPATIENT
Start: 2024-07-05

## 2024-07-05 RX ORDER — SODIUM CHLORIDE 0.9 % (FLUSH) 0.9 %
10 SYRINGE (ML) INJECTION
OUTPATIENT
Start: 2024-07-08

## 2024-07-05 RX ORDER — DIPHENHYDRAMINE HYDROCHLORIDE 50 MG/ML
25 INJECTION INTRAMUSCULAR; INTRAVENOUS
Start: 2024-07-08

## 2024-07-05 NOTE — PROGRESS NOTES
[facility] Hematolgy/Oncology  History & Physical    Subjective:      Patient ID:   NAME: Tami Herrera : 1955     69 y.o. female    Referring Doc: Nelia Melo FNP  Other Physicians:        Chief Complaint: Anemia       HPI:  69 y.o. female with diagnosis of Anemia who has been referred by Nelia Melo FNP for evaluation by medical hematology/oncology.     She has a history of chronic anemia and was previously seen by Dr. Horne in /.     She fell in March of this year and ended up with a fractured left tibia. She had surgery and then had a lot of bleeding post op. She is not having any active bleeding right now.     Her most recent hemoglobin is 8.5 and her indices indicate iron deficiency.   She has had anemia for most of her life per patient. She had history of endometrial cancer in  and she required surgery. She had pulmonary emboli in . She denies any current CP, SOB, HA's or N/V.   She denies any active bleeding at this time.     She denies any lack of energy or fatigue. She is not on OTC iron supplements and folic acid that was previously prescribed.   She did slip and fall in the lobby of the cancer center this morning per patient. She states she did not hurt herself and is declining further medical care.     ROS:   Review of Systems   Constitutional:  Positive for fatigue. Negative for activity change, appetite change and fever.   HENT:  Negative for congestion, mouth sores, postnasal drip, rhinorrhea, sinus pressure, sore throat and trouble swallowing.    Eyes:  Negative for photophobia and visual disturbance.   Respiratory:  Negative for cough, chest tightness, shortness of breath and wheezing.    Cardiovascular:  Negative for chest pain and leg swelling.   Gastrointestinal:  Negative for abdominal distention, abdominal pain, constipation, diarrhea, nausea and vomiting.   Endocrine: Negative for cold intolerance.   Genitourinary:  Negative for decreased urine volume,  dysuria and frequency.   Musculoskeletal:  Positive for arthralgias, back pain, joint swelling and myalgias.   Skin:  Negative for pallor and rash.   Allergic/Immunologic: Negative for immunocompromised state.   Neurological:  Negative for dizziness, syncope, weakness, light-headedness, numbness and headaches.   Hematological:  Negative for adenopathy. Does not bruise/bleed easily.   Psychiatric/Behavioral:  Negative for sleep disturbance. The patient is not nervous/anxious.             Past Medical/Surgical History:  Past Medical History:   Diagnosis Date    Anemia     Endometrial cancer     History of endometrial cancer     Hx pulmonary embolism     Knee pain, bilateral     Microcytic anemia 12/11/2018    Obesity     S/P IVC filter     Sciatica      Past Surgical History:   Procedure Laterality Date    gastric sleeve      HYSTERECTOMY      ORIF TIBIA & FIBULA FRACTURES Right 03/29/2024    TONSILLECTOMY           Allergies:  Review of patient's allergies indicates:   Allergen Reactions    Amoxicillin Rash    Pcn [penicillins] Hives       Social/Family History:  Social History     Socioeconomic History    Marital status: Single   Tobacco Use    Smoking status: Never    Smokeless tobacco: Never   Substance and Sexual Activity    Alcohol use: No    Drug use: No     No family history on file.      Medications:    Current Outpatient Medications:     folic acid (FOLVITE) 1 MG tablet, Take 1 tablet (1 mg total) by mouth once daily., Disp: 100 tablet, Rfl: 3    ibuprofen (ADVIL,MOTRIN) 800 MG tablet, Take 1 tablet (800 mg total) by mouth every 6 (six) hours as needed for Pain., Disp: 20 tablet, Rfl: 0    iron-vitamin C 100-250 mg, ICAR-C, 100-250 mg Tab, TAKE 1 TABLET BY MOUTH EVERY DAY, Disp: 30 tablet, Rfl: 0    iron-vitamin C 100-250 mg, ICAR-C, 100-250 mg Tab, Take 1 tablet by mouth Daily., Disp: 90 each, Rfl: 3    lactulose (CHRONULAC) 10 gram/15 mL solution, TK 15 MLS PO D CAN INCREASE TO 30 MLS IF NO RELIEF, Disp: ,  "Rfl: 5    LORazepam (ATIVAN) 0.5 MG tablet, 1 po qd prn anxiety or may add to trazadoen prn sleep, Disp: 30 tablet, Rfl: 2    losartan-hydrochlorothiazide 100-12.5 mg (HYZAAR) 100-12.5 mg Tab, Take 1 tablet by mouth once daily., Disp: 90 tablet, Rfl: 3    omeprazole (PRILOSEC) 20 MG capsule, Take 2 capsules (40 mg total) by mouth once daily., Disp: 60 capsule, Rfl: 11    oxyCODONE-acetaminophen (PERCOCET) 5-325 mg per tablet, Take 1 tablet by mouth every 6 (six) hours as needed for Pain., Disp: 12 tablet, Rfl: 0    traZODone (DESYREL) 50 MG tablet, TAKE 1 TABLET(50 MG) BY MOUTH EVERY EVENING, Disp: 30 tablet, Rfl: 2      Pathology:   Cancer Staging   No matching staging information was found for the patient.        Objective:   Vitals:  Blood pressure (!) 165/74, pulse 77, temperature 97.1 °F (36.2 °C), resp. rate 20, height 5' 2" (1.575 m), weight 110 kg (242 lb 6.4 oz).    Physical Examination:   Physical Exam  HENT:      Head: Normocephalic and atraumatic.      Mouth/Throat:      Mouth: Mucous membranes are moist.   Cardiovascular:      Rate and Rhythm: Normal rate and regular rhythm.      Pulses: Normal pulses.      Heart sounds: Murmur heard.   Pulmonary:      Effort: Pulmonary effort is normal. No respiratory distress.      Breath sounds: Normal breath sounds. No wheezing.   Abdominal:      General: There is no distension.      Palpations: Abdomen is soft. There is no mass.      Tenderness: There is no abdominal tenderness.   Musculoskeletal:         General: Swelling present.      Right lower leg: No edema.      Left lower leg: No edema.      Comments: Decreased ROM   Baseline since leg surgery     Skin:     General: Skin is warm and dry.      Capillary Refill: Capillary refill takes 2 to 3 seconds.      Findings: No bruising or rash.   Neurological:      Mental Status: She is alert and oriented to person, place, and time. Mental status is at baseline.      Motor: No weakness.   Psychiatric:         Mood and " Affect: Mood normal.         Behavior: Behavior normal.            Labs:   Lab Results   Component Value Date    WBC 6.87 06/15/2024    HGB 8.5 (L) 06/15/2024    HCT 29.4 (L) 06/15/2024    MCV 79 (L) 06/15/2024     06/15/2024    CMP  Sodium   Date Value Ref Range Status   06/15/2024 141 136 - 145 mmol/L Final     Potassium   Date Value Ref Range Status   06/15/2024 4.4 3.5 - 5.1 mmol/L Final     Chloride   Date Value Ref Range Status   06/15/2024 110 95 - 110 mmol/L Final     CO2   Date Value Ref Range Status   06/15/2024 22 (L) 23 - 29 mmol/L Final     Glucose   Date Value Ref Range Status   06/15/2024 90 70 - 110 mg/dL Final     BUN   Date Value Ref Range Status   06/15/2024 35 (H) 8 - 23 mg/dL Final     Creatinine   Date Value Ref Range Status   06/15/2024 1.4 0.5 - 1.4 mg/dL Final     Calcium   Date Value Ref Range Status   06/15/2024 8.9 8.7 - 10.5 mg/dL Final     Total Protein   Date Value Ref Range Status   06/15/2024 7.1 6.0 - 8.4 g/dL Final     Albumin   Date Value Ref Range Status   06/15/2024 3.6 3.5 - 5.2 g/dL Final     Total Bilirubin   Date Value Ref Range Status   06/15/2024 0.3 0.1 - 1.0 mg/dL Final     Comment:     For infants and newborns, interpretation of results should be based  on gestational age, weight and in agreement with clinical  observations.    Premature Infant recommended reference ranges:  Up to 24 hours.............<8.0 mg/dL  Up to 48 hours............<12.0 mg/dL  3-5 days..................<15.0 mg/dL  6-29 days.................<15.0 mg/dL       Alkaline Phosphatase   Date Value Ref Range Status   06/15/2024 158 (H) 55 - 135 U/L Final     AST   Date Value Ref Range Status   06/15/2024 19 10 - 40 U/L Final     ALT   Date Value Ref Range Status   06/15/2024 12 10 - 44 U/L Final     Anion Gap   Date Value Ref Range Status   06/15/2024 9 8 - 16 mmol/L Final     eGFR if    Date Value Ref Range Status   05/09/2019 >60.0 >60 mL/min/1.73 m^2 Final     eGFR if non     Date Value Ref Range Status   05/09/2019 54.3 (A) >60 mL/min/1.73 m^2 Final     Comment:     Calculation used to obtain the estimated glomerular filtration  rate (eGFR) is the CKD-EPI equation.            Radiology/Diagnostic Studies:    Impression:     1. In this patient with tibial and fibular fractures, intramedullary akash and screw construct of the tibia appears intact spanning prior fracture.  There is subcutaneous edema about the lower extremity without focal organized collection to suggest abscess.  No findings to suggest large focal hematoma.  Please see above for additional findings.        Electronically signed by:Gus Brar MD  Date:                                            03/31/2024  Time:                                           16:42      All lab results and imaging results have been reviewed and discussed with the patient    Assessment:   (1) 69 y.o. female with diagnosis of Chronic anemia referred back to us by Nelia Melo NP. She was a previous patient of ours in 2018/2019 and then did not follow up. She has a history of anemia that required blood transfusions. She relates these transfusions to loss of blood from cancer treatment at in 2009. She did have recent leg surgery and had post op bleeding and complications. She is not taking her recommended iron or folate.   - hgb 8.5   - ferritin 13  - iron 18  - injectafer x 2   - start I car C   - start folate   - recheck labs after infusions and then follow up with Dr. Horne   - patient declines follow up with GI   - encouraged her to do so     (2) Kidney insufficiency   - Per chart review, noted patient's GFR decline and small rebound   - refer to nephrology, could be further contributing to anemia     (3) Endometrial cancer 2009  - previously completed chemo and XRT       (4) PE   - during 209 cancer treatment   - she has an IVC filter   - she is not on any anticoagulants at this time    (5) Tibial fracture   - post  surgery with Dr. Barragan   - stable at this time   - continue to follow       VISIT DIAGNOSES:          Iron deficiency anemia due to chronic blood loss  -     CBC W/ AUTO DIFFERENTIAL; Future; Expected date: 07/05/2024  -     CMP; Future; Expected date: 07/05/2024  -     Iron and TIBC; Future; Expected date: 07/05/2024  -     FERRITIN; Future; Expected date: 07/05/2024  -     Folate; Future; Expected date: 07/05/2024  -     iron-vitamin C 100-250 mg, ICAR-C, 100-250 mg Tab; Take 1 tablet by mouth Daily.  Dispense: 90 each; Refill: 3    Kidney insufficiency  -     Ambulatory referral/consult to Nephrology; Future; Expected date: 07/12/2024    Dietary folate deficiency anemia  -     Folate; Future; Expected date: 07/05/2024    Presence of IVC filter    Microcytic anemia    History of pulmonary embolus (PE)    History of endometrial cancer    Other orders  -     ferric carboxymaltose (INJECTAFER) 750 mg in 0.9% NaCl 265 mL infusion  -     acetaminophen tablet 650 mg  -     0.9%  NaCl infusion  -     EPINEPHrine (EPIPEN) 0.3 mg/0.3 mL pen injection 0.3 mg  -     diphenhydrAMINE injection 50 mg  -     hydrocortisone sodium succinate injection 100 mg  -     sodium chloride 0.9% flush 10 mL  -     heparin, porcine (PF) 100 unit/mL injection flush 500 Units  -     0.9% NaCl 100 mL flush bag  -     diphenhydrAMINE injection 25 mg            Plan:     PLAN:  Injectafer x 2   2. Encouraged follow up GI, patient declines at this time  3. Start back on ICAR C and folate orally   4. Referral to nephrology   5. Follow up with ortho         RTC in  4 weeks with Dr. Horne     Fax note to Nelia Melo FNP        I have explained and the patient understands all of  the current recommendation(s). I have answered all of their questions to the best of my ability and to their complete satisfaction.             Thank you for allowing me to participate in this patient's care. Please call with any questions or  concerns.    Electronically signed Fern Biggs NP

## 2024-07-10 ENCOUNTER — TELEPHONE (OUTPATIENT)
Facility: CLINIC | Age: 69
End: 2024-07-10
Payer: MEDICARE

## 2024-07-10 RX ORDER — DIPHENHYDRAMINE HYDROCHLORIDE 50 MG/ML
25 INJECTION INTRAMUSCULAR; INTRAVENOUS
Start: 2024-07-10

## 2024-07-10 RX ORDER — EPINEPHRINE 0.3 MG/.3ML
0.3 INJECTION SUBCUTANEOUS ONCE AS NEEDED
OUTPATIENT
Start: 2024-07-10

## 2024-07-10 RX ORDER — SODIUM CHLORIDE 0.9 % (FLUSH) 0.9 %
10 SYRINGE (ML) INJECTION
OUTPATIENT
Start: 2024-07-10

## 2024-07-10 RX ORDER — HEPARIN 100 UNIT/ML
500 SYRINGE INTRAVENOUS
OUTPATIENT
Start: 2024-07-10

## 2024-07-10 RX ORDER — DIPHENHYDRAMINE HYDROCHLORIDE 50 MG/ML
50 INJECTION INTRAMUSCULAR; INTRAVENOUS ONCE AS NEEDED
OUTPATIENT
Start: 2024-07-10

## 2024-07-10 NOTE — TELEPHONE ENCOUNTER
----- Message from Fern Biggs NP sent at 7/9/2024  1:29 PM CDT -----  Lets do venofer  ----- Message -----  From: Zoe Agudelo  Sent: 7/9/2024   1:21 PM CDT  To: Fern Biggs NP; #    Good Afternoon,     Patient was ordered injectafer. Per her insurance this is non-preferred and will get denied if she hasn't tried and failed both venofer and ferrlecit.     Thanks,    Zoe Agudelo, RN

## 2024-07-11 ENCOUNTER — TELEPHONE (OUTPATIENT)
Facility: CLINIC | Age: 69
End: 2024-07-11
Payer: MEDICARE

## 2024-07-11 NOTE — TELEPHONE ENCOUNTER
Patient requesting clarification of her iron orders. Per previous notes, patient's injectafer orders denied by insurance and switched to venofer. Patient made aware and verbalized understanding.

## 2024-07-16 ENCOUNTER — INFUSION (OUTPATIENT)
Dept: INFUSION THERAPY | Facility: HOSPITAL | Age: 69
End: 2024-07-16
Attending: INTERNAL MEDICINE
Payer: MEDICARE

## 2024-07-16 VITALS
RESPIRATION RATE: 18 BRPM | HEART RATE: 65 BPM | HEIGHT: 62 IN | DIASTOLIC BLOOD PRESSURE: 62 MMHG | OXYGEN SATURATION: 94 % | TEMPERATURE: 98 F | WEIGHT: 243.81 LBS | SYSTOLIC BLOOD PRESSURE: 129 MMHG | BODY MASS INDEX: 44.87 KG/M2

## 2024-07-16 DIAGNOSIS — D50.0 IRON DEFICIENCY ANEMIA DUE TO CHRONIC BLOOD LOSS: ICD-10-CM

## 2024-07-16 DIAGNOSIS — D64.9 ANEMIA, UNSPECIFIED TYPE: Primary | ICD-10-CM

## 2024-07-16 PROCEDURE — 96365 THER/PROPH/DIAG IV INF INIT: CPT

## 2024-07-16 PROCEDURE — 63600175 PHARM REV CODE 636 W HCPCS: Performed by: NURSE PRACTITIONER

## 2024-07-16 PROCEDURE — 25000003 PHARM REV CODE 250: Performed by: NURSE PRACTITIONER

## 2024-07-16 RX ORDER — SODIUM CHLORIDE 0.9 % (FLUSH) 0.9 %
10 SYRINGE (ML) INJECTION
Status: DISCONTINUED | OUTPATIENT
Start: 2024-07-16 | End: 2024-07-16 | Stop reason: HOSPADM

## 2024-07-16 RX ORDER — DIPHENHYDRAMINE HYDROCHLORIDE 50 MG/ML
25 INJECTION INTRAMUSCULAR; INTRAVENOUS
Start: 2024-07-23

## 2024-07-16 RX ORDER — EPINEPHRINE 0.3 MG/.3ML
0.3 INJECTION SUBCUTANEOUS ONCE AS NEEDED
OUTPATIENT
Start: 2024-07-23

## 2024-07-16 RX ORDER — HEPARIN 100 UNIT/ML
500 SYRINGE INTRAVENOUS
OUTPATIENT
Start: 2024-07-23

## 2024-07-16 RX ORDER — SODIUM CHLORIDE 0.9 % (FLUSH) 0.9 %
10 SYRINGE (ML) INJECTION
OUTPATIENT
Start: 2024-07-23

## 2024-07-16 RX ORDER — DIPHENHYDRAMINE HYDROCHLORIDE 50 MG/ML
50 INJECTION INTRAMUSCULAR; INTRAVENOUS ONCE AS NEEDED
OUTPATIENT
Start: 2024-07-23

## 2024-07-16 RX ORDER — DIPHENHYDRAMINE HYDROCHLORIDE 50 MG/ML
25 INJECTION INTRAMUSCULAR; INTRAVENOUS
Status: DISCONTINUED | OUTPATIENT
Start: 2024-07-16 | End: 2024-07-16 | Stop reason: HOSPADM

## 2024-07-16 RX ADMIN — SODIUM CHLORIDE: 9 INJECTION, SOLUTION INTRAVENOUS at 09:07

## 2024-07-16 RX ADMIN — IRON SUCROSE 100 MG: 20 INJECTION, SOLUTION INTRAVENOUS at 10:07

## 2024-07-16 NOTE — PLAN OF CARE
Problem: Adult Inpatient Plan of Care  Goal: Optimal Comfort and Wellbeing  Outcome: Progressing  Intervention: Provide Person-Centered Care  Flowsheets (Taken 7/16/2024 1001)  Trust Relationship/Rapport:   care explained   choices provided   emotional support provided   empathic listening provided   questions answered   questions encouraged   reassurance provided   thoughts/feelings acknowledged

## 2024-07-17 ENCOUNTER — TELEPHONE (OUTPATIENT)
Dept: INFUSION THERAPY | Facility: HOSPITAL | Age: 69
End: 2024-07-17

## 2024-07-17 NOTE — TELEPHONE ENCOUNTER
7/17/24 [facility] post infusion phone call.  No answer - left message to call MD for any needs or concerns

## 2024-07-24 ENCOUNTER — INFUSION (OUTPATIENT)
Dept: INFUSION THERAPY | Facility: HOSPITAL | Age: 69
End: 2024-07-24
Attending: NURSE PRACTITIONER
Payer: MEDICARE

## 2024-07-24 VITALS
OXYGEN SATURATION: 97 % | SYSTOLIC BLOOD PRESSURE: 139 MMHG | TEMPERATURE: 98 F | DIASTOLIC BLOOD PRESSURE: 61 MMHG | HEART RATE: 68 BPM

## 2024-07-24 DIAGNOSIS — D64.9 ANEMIA, UNSPECIFIED TYPE: Primary | ICD-10-CM

## 2024-07-24 DIAGNOSIS — D50.0 IRON DEFICIENCY ANEMIA DUE TO CHRONIC BLOOD LOSS: ICD-10-CM

## 2024-07-24 PROCEDURE — 63600175 PHARM REV CODE 636 W HCPCS: Performed by: NURSE PRACTITIONER

## 2024-07-24 PROCEDURE — 25000003 PHARM REV CODE 250: Performed by: NURSE PRACTITIONER

## 2024-07-24 PROCEDURE — 96365 THER/PROPH/DIAG IV INF INIT: CPT

## 2024-07-24 RX ORDER — HEPARIN 100 UNIT/ML
500 SYRINGE INTRAVENOUS
Status: DISCONTINUED | OUTPATIENT
Start: 2024-07-24 | End: 2024-07-24 | Stop reason: HOSPADM

## 2024-07-24 RX ORDER — DIPHENHYDRAMINE HYDROCHLORIDE 50 MG/ML
50 INJECTION INTRAMUSCULAR; INTRAVENOUS ONCE AS NEEDED
OUTPATIENT
Start: 2024-07-30

## 2024-07-24 RX ORDER — EPINEPHRINE 0.3 MG/.3ML
0.3 INJECTION SUBCUTANEOUS ONCE AS NEEDED
OUTPATIENT
Start: 2024-07-30

## 2024-07-24 RX ORDER — DIPHENHYDRAMINE HYDROCHLORIDE 50 MG/ML
25 INJECTION INTRAMUSCULAR; INTRAVENOUS
Start: 2024-07-30

## 2024-07-24 RX ORDER — HEPARIN 100 UNIT/ML
500 SYRINGE INTRAVENOUS
OUTPATIENT
Start: 2024-07-30

## 2024-07-24 RX ORDER — SODIUM CHLORIDE 0.9 % (FLUSH) 0.9 %
10 SYRINGE (ML) INJECTION
Status: DISCONTINUED | OUTPATIENT
Start: 2024-07-24 | End: 2024-07-24 | Stop reason: HOSPADM

## 2024-07-24 RX ORDER — DIPHENHYDRAMINE HYDROCHLORIDE 50 MG/ML
25 INJECTION INTRAMUSCULAR; INTRAVENOUS
Status: DISCONTINUED | OUTPATIENT
Start: 2024-07-24 | End: 2024-07-24 | Stop reason: HOSPADM

## 2024-07-24 RX ORDER — SODIUM CHLORIDE 0.9 % (FLUSH) 0.9 %
10 SYRINGE (ML) INJECTION
OUTPATIENT
Start: 2024-07-30

## 2024-07-24 RX ADMIN — IRON SUCROSE 100 MG: 20 INJECTION, SOLUTION INTRAVENOUS at 07:07

## 2024-07-24 NOTE — PLAN OF CARE
Problem: Adult Inpatient Plan of Care  Goal: Plan of Care Review  7/24/2024 0752 by Sara Dawson RN  Outcome: Met  7/24/2024 0752 by Sara Dawson RN  Outcome: Progressing  Goal: Patient-Specific Goal (Individualized)  7/24/2024 0752 by Sara Dawson RN  Outcome: Met  7/24/2024 0752 by Sara Dawson RN  Outcome: Progressing  Goal: Absence of Hospital-Acquired Illness or Injury  7/24/2024 0752 by Sara Dawson RN  Outcome: Met  7/24/2024 0752 by Sara Dawson RN  Outcome: Progressing  Goal: Optimal Comfort and Wellbeing  7/24/2024 0752 by Sara Dawson RN  Outcome: Met  7/24/2024 0752 by Sara Dawson RN  Outcome: Progressing  Goal: Readiness for Transition of Care  7/24/2024 0752 by Sara Dawson RN  Outcome: Met  7/24/2024 0752 by Sara Dawson RN  Outcome: Progressing

## 2024-07-31 ENCOUNTER — INFUSION (OUTPATIENT)
Dept: INFUSION THERAPY | Facility: HOSPITAL | Age: 69
End: 2024-07-31
Attending: NURSE PRACTITIONER
Payer: MEDICARE

## 2024-07-31 VITALS
BODY MASS INDEX: 45.4 KG/M2 | WEIGHT: 246.69 LBS | DIASTOLIC BLOOD PRESSURE: 61 MMHG | HEIGHT: 62 IN | HEART RATE: 72 BPM | RESPIRATION RATE: 16 BRPM | SYSTOLIC BLOOD PRESSURE: 129 MMHG | OXYGEN SATURATION: 96 %

## 2024-07-31 DIAGNOSIS — D50.0 IRON DEFICIENCY ANEMIA DUE TO CHRONIC BLOOD LOSS: ICD-10-CM

## 2024-07-31 DIAGNOSIS — D64.9 ANEMIA, UNSPECIFIED TYPE: Primary | ICD-10-CM

## 2024-07-31 PROCEDURE — 25000003 PHARM REV CODE 250: Performed by: NURSE PRACTITIONER

## 2024-07-31 PROCEDURE — 63600175 PHARM REV CODE 636 W HCPCS: Performed by: NURSE PRACTITIONER

## 2024-07-31 PROCEDURE — 96365 THER/PROPH/DIAG IV INF INIT: CPT

## 2024-07-31 RX ORDER — EPINEPHRINE 0.3 MG/.3ML
0.3 INJECTION SUBCUTANEOUS ONCE AS NEEDED
OUTPATIENT
Start: 2024-08-07

## 2024-07-31 RX ORDER — DIPHENHYDRAMINE HYDROCHLORIDE 50 MG/ML
25 INJECTION INTRAMUSCULAR; INTRAVENOUS
Status: DISCONTINUED | OUTPATIENT
Start: 2024-07-31 | End: 2024-07-31 | Stop reason: HOSPADM

## 2024-07-31 RX ORDER — SODIUM CHLORIDE 0.9 % (FLUSH) 0.9 %
10 SYRINGE (ML) INJECTION
Status: DISCONTINUED | OUTPATIENT
Start: 2024-07-31 | End: 2024-07-31 | Stop reason: HOSPADM

## 2024-07-31 RX ORDER — DIPHENHYDRAMINE HYDROCHLORIDE 50 MG/ML
25 INJECTION INTRAMUSCULAR; INTRAVENOUS
Start: 2024-08-07

## 2024-07-31 RX ORDER — SODIUM CHLORIDE 0.9 % (FLUSH) 0.9 %
10 SYRINGE (ML) INJECTION
OUTPATIENT
Start: 2024-08-07

## 2024-07-31 RX ORDER — DIPHENHYDRAMINE HYDROCHLORIDE 50 MG/ML
50 INJECTION INTRAMUSCULAR; INTRAVENOUS ONCE AS NEEDED
OUTPATIENT
Start: 2024-08-07

## 2024-07-31 RX ORDER — HEPARIN 100 UNIT/ML
500 SYRINGE INTRAVENOUS
OUTPATIENT
Start: 2024-08-07

## 2024-07-31 RX ADMIN — IRON SUCROSE 100 MG: 20 INJECTION, SOLUTION INTRAVENOUS at 07:07

## 2024-07-31 RX ADMIN — SODIUM CHLORIDE: 9 INJECTION, SOLUTION INTRAVENOUS at 07:07

## 2024-07-31 NOTE — PLAN OF CARE
Problem: Adult Inpatient Plan of Care  Goal: Optimal Comfort and Wellbeing  Outcome: Progressing  Intervention: Provide Person-Centered Care  Flowsheets (Taken 7/31/2024 9381)  Trust Relationship/Rapport:   care explained   questions encouraged   choices provided   reassurance provided   thoughts/feelings acknowledged   empathic listening provided   emotional support provided

## 2024-08-07 ENCOUNTER — INFUSION (OUTPATIENT)
Dept: INFUSION THERAPY | Facility: HOSPITAL | Age: 69
End: 2024-08-07
Attending: INTERNAL MEDICINE
Payer: MEDICARE

## 2024-08-07 VITALS
RESPIRATION RATE: 16 BRPM | BODY MASS INDEX: 44.65 KG/M2 | WEIGHT: 242.63 LBS | TEMPERATURE: 98 F | HEART RATE: 66 BPM | SYSTOLIC BLOOD PRESSURE: 152 MMHG | OXYGEN SATURATION: 99 % | DIASTOLIC BLOOD PRESSURE: 70 MMHG | HEIGHT: 62 IN

## 2024-08-07 DIAGNOSIS — D64.9 ANEMIA, UNSPECIFIED TYPE: Primary | ICD-10-CM

## 2024-08-07 DIAGNOSIS — D50.0 IRON DEFICIENCY ANEMIA DUE TO CHRONIC BLOOD LOSS: ICD-10-CM

## 2024-08-07 PROCEDURE — 96365 THER/PROPH/DIAG IV INF INIT: CPT

## 2024-08-07 PROCEDURE — A4216 STERILE WATER/SALINE, 10 ML: HCPCS | Performed by: NURSE PRACTITIONER

## 2024-08-07 PROCEDURE — 63600175 PHARM REV CODE 636 W HCPCS: Performed by: NURSE PRACTITIONER

## 2024-08-07 PROCEDURE — 25000003 PHARM REV CODE 250: Performed by: NURSE PRACTITIONER

## 2024-08-07 RX ORDER — DIPHENHYDRAMINE HYDROCHLORIDE 50 MG/ML
25 INJECTION INTRAMUSCULAR; INTRAVENOUS
Status: DISCONTINUED | OUTPATIENT
Start: 2024-08-07 | End: 2024-08-07 | Stop reason: HOSPADM

## 2024-08-07 RX ORDER — SODIUM CHLORIDE 0.9 % (FLUSH) 0.9 %
10 SYRINGE (ML) INJECTION
Status: DISCONTINUED | OUTPATIENT
Start: 2024-08-07 | End: 2024-08-07 | Stop reason: HOSPADM

## 2024-08-07 RX ORDER — DIPHENHYDRAMINE HYDROCHLORIDE 50 MG/ML
50 INJECTION INTRAMUSCULAR; INTRAVENOUS ONCE AS NEEDED
OUTPATIENT
Start: 2024-08-14

## 2024-08-07 RX ORDER — SODIUM CHLORIDE 0.9 % (FLUSH) 0.9 %
10 SYRINGE (ML) INJECTION
OUTPATIENT
Start: 2024-08-14

## 2024-08-07 RX ORDER — DIPHENHYDRAMINE HYDROCHLORIDE 50 MG/ML
25 INJECTION INTRAMUSCULAR; INTRAVENOUS
Start: 2024-08-14

## 2024-08-07 RX ORDER — EPINEPHRINE 0.3 MG/.3ML
0.3 INJECTION SUBCUTANEOUS ONCE AS NEEDED
OUTPATIENT
Start: 2024-08-14

## 2024-08-07 RX ORDER — HEPARIN 100 UNIT/ML
500 SYRINGE INTRAVENOUS
OUTPATIENT
Start: 2024-08-14

## 2024-08-07 RX ADMIN — SODIUM CHLORIDE: 9 INJECTION, SOLUTION INTRAVENOUS at 07:08

## 2024-08-07 RX ADMIN — IRON SUCROSE 100 MG: 20 INJECTION, SOLUTION INTRAVENOUS at 07:08

## 2024-08-07 RX ADMIN — Medication 10 ML: at 07:08

## 2024-08-14 ENCOUNTER — INFUSION (OUTPATIENT)
Dept: INFUSION THERAPY | Facility: HOSPITAL | Age: 69
End: 2024-08-14
Attending: NURSE PRACTITIONER
Payer: MEDICARE

## 2024-08-14 VITALS
SYSTOLIC BLOOD PRESSURE: 137 MMHG | OXYGEN SATURATION: 98 % | DIASTOLIC BLOOD PRESSURE: 73 MMHG | TEMPERATURE: 98 F | HEART RATE: 75 BPM | RESPIRATION RATE: 18 BRPM

## 2024-08-14 DIAGNOSIS — D64.9 ANEMIA, UNSPECIFIED TYPE: Primary | ICD-10-CM

## 2024-08-14 DIAGNOSIS — D50.0 IRON DEFICIENCY ANEMIA DUE TO CHRONIC BLOOD LOSS: ICD-10-CM

## 2024-08-14 PROCEDURE — 25000003 PHARM REV CODE 250: Performed by: NURSE PRACTITIONER

## 2024-08-14 PROCEDURE — 96365 THER/PROPH/DIAG IV INF INIT: CPT

## 2024-08-14 PROCEDURE — 63600175 PHARM REV CODE 636 W HCPCS: Performed by: NURSE PRACTITIONER

## 2024-08-14 PROCEDURE — A4216 STERILE WATER/SALINE, 10 ML: HCPCS | Performed by: NURSE PRACTITIONER

## 2024-08-14 RX ORDER — DIPHENHYDRAMINE HYDROCHLORIDE 50 MG/ML
25 INJECTION INTRAMUSCULAR; INTRAVENOUS
Start: 2024-08-21

## 2024-08-14 RX ORDER — SODIUM CHLORIDE 0.9 % (FLUSH) 0.9 %
10 SYRINGE (ML) INJECTION
OUTPATIENT
Start: 2024-08-21

## 2024-08-14 RX ORDER — SODIUM CHLORIDE 0.9 % (FLUSH) 0.9 %
10 SYRINGE (ML) INJECTION
Status: DISCONTINUED | OUTPATIENT
Start: 2024-08-14 | End: 2024-08-14 | Stop reason: HOSPADM

## 2024-08-14 RX ORDER — EPINEPHRINE 0.3 MG/.3ML
0.3 INJECTION SUBCUTANEOUS ONCE AS NEEDED
OUTPATIENT
Start: 2024-08-21

## 2024-08-14 RX ORDER — HEPARIN 100 UNIT/ML
500 SYRINGE INTRAVENOUS
OUTPATIENT
Start: 2024-08-21

## 2024-08-14 RX ORDER — DIPHENHYDRAMINE HYDROCHLORIDE 50 MG/ML
50 INJECTION INTRAMUSCULAR; INTRAVENOUS ONCE AS NEEDED
OUTPATIENT
Start: 2024-08-21

## 2024-08-14 RX ADMIN — Medication 10 ML: at 07:08

## 2024-08-14 RX ADMIN — IRON SUCROSE 100 MG: 20 INJECTION, SOLUTION INTRAVENOUS at 07:08

## 2024-08-14 RX ADMIN — SODIUM CHLORIDE: 9 INJECTION, SOLUTION INTRAVENOUS at 07:08

## 2024-08-14 NOTE — PLAN OF CARE
Problem: Adult Inpatient Plan of Care  Goal: Optimal Comfort and Wellbeing  Outcome: Progressing  Intervention: Provide Person-Centered Care  Flowsheets (Taken 8/14/2024 0811)  Trust Relationship/Rapport:   care explained   choices provided   emotional support provided   empathic listening provided   questions answered   questions encouraged   reassurance provided   thoughts/feelings acknowledged

## 2024-08-15 ENCOUNTER — TELEPHONE (OUTPATIENT)
Facility: CLINIC | Age: 69
End: 2024-08-15
Payer: MEDICARE

## 2024-08-15 DIAGNOSIS — D50.0 IRON DEFICIENCY ANEMIA DUE TO CHRONIC BLOOD LOSS: Primary | ICD-10-CM

## 2024-08-15 DIAGNOSIS — D52.0 DIETARY FOLATE DEFICIENCY ANEMIA: ICD-10-CM

## 2024-08-15 NOTE — TELEPHONE ENCOUNTER
----- Message from Tracy Van sent at 8/15/2024  8:17 AM CDT -----  Pt is asking for labs to be put in again for 2 wks, she said that she will need to do labs again before iron infusion.

## 2024-08-20 ENCOUNTER — TELEPHONE (OUTPATIENT)
Facility: CLINIC | Age: 69
End: 2024-08-20
Payer: MEDICARE

## 2024-08-20 NOTE — TELEPHONE ENCOUNTER
Due to Venofer Shortage, per SHARDA Milian's verbal order, patient's remaining iron infusions to be discontinued and patient to get her labs rechecked in 2 weeks. Patient made aware of above and verbalized understanding.

## 2024-08-28 NOTE — PROGRESS NOTES
[facility] Hematology/Oncology  PROGRESS NOTE -  Follow-up Visit      Subjective:       Patient ID:   NAME: Tami Herrera : 1955     69 y.o. female    Referring Doc: Jesusita Rouse NP  Other Physicians: Evangelina Rg,     Chief Complaint:  Anemia f/u    History of Present Illness:     Patient returns today for a regularly scheduled follow-up visit.  The patient is here today to go over the results of the recently ordered labs, tests and studies.       She is here by herself. She last showed for hematology clinic appointment with myself back in May 2019. She saw my NP Fern Maria on 2024     She is walking with walker today.     She had broken her right tibia in 2024 and has some post-op bleeding issues. Followed by Dr Kendall Barragan with ortho in 2024     She was previously followed in hematology clinic but had last showed in May 2019    She had 5 of 8 total IV irons    She reports that she otherwise feels ok. She denies any CP, SOB, HA's or N/V; energy levels are better              ROS:   GEN: normal without any fever, night sweats or weight loss; right tibia fracture   HEENT: normal with no HA's, sore throat, stiff neck, changes in vision  CV: normal with no CP, SOB, PND, ESQUIVEL or orthopnea  PULM: normal with no SOB, cough, hemoptysis, sputum or pleuritic pain  GI: normal with no abdominal pain, nausea, vomiting, constipation, diarrhea, melanotic stools, BRBPR, or hematemesis  : normal with no hematuria, dysuria  BREAST: normal with no mass, discharge, pain  SKIN: normal with no rash, erythema, bruising, or swelling    Allergies:  Review of patient's allergies indicates:   Allergen Reactions    Amoxicillin Rash    Pcn [penicillins] Hives       Medications:    Current Outpatient Medications:     ibuprofen (ADVIL,MOTRIN) 800 MG tablet, Take 1 tablet (800 mg total) by mouth every 6 (six) hours as needed for Pain., Disp: 20 tablet, Rfl: 0    iron-vitamin C 100-250 mg, ICAR-C, 100-250 mg Tab, TAKE 1  "TABLET BY MOUTH EVERY DAY, Disp: 30 tablet, Rfl: 0    iron-vitamin C 100-250 mg, ICAR-C, 100-250 mg Tab, Take 1 tablet by mouth Daily., Disp: 90 each, Rfl: 3    lactulose (CHRONULAC) 10 gram/15 mL solution, TK 15 MLS PO D CAN INCREASE TO 30 MLS IF NO RELIEF, Disp: , Rfl: 5    LORazepam (ATIVAN) 0.5 MG tablet, 1 po qd prn anxiety or may add to trazadoen prn sleep, Disp: 30 tablet, Rfl: 2    traZODone (DESYREL) 50 MG tablet, TAKE 1 TABLET(50 MG) BY MOUTH EVERY EVENING, Disp: 30 tablet, Rfl: 2    folic acid (FOLVITE) 1 MG tablet, Take 1 tablet (1 mg total) by mouth once daily., Disp: 100 tablet, Rfl: 3    losartan-hydrochlorothiazide 100-12.5 mg (HYZAAR) 100-12.5 mg Tab, Take 1 tablet by mouth once daily., Disp: 90 tablet, Rfl: 3    omeprazole (PRILOSEC) 20 MG capsule, Take 2 capsules (40 mg total) by mouth once daily., Disp: 60 capsule, Rfl: 11    oxyCODONE-acetaminophen (PERCOCET) 5-325 mg per tablet, Take 1 tablet by mouth every 6 (six) hours as needed for Pain., Disp: 12 tablet, Rfl: 0    PMHx/PSHx Updates:  See patient's last visit with me on 5/13/2019.  See H&P on 12/12/2018        Pathology:  Cancer Staging  No matching staging information was found for the patient.          Objective:     Vitals:  Blood pressure (!) 153/66, pulse 68, temperature 98.7 °F (37.1 °C), resp. rate 16, height 5' 2" (1.575 m).    Physical Examination:   GEN: no apparent distress, comfortable; AAOx3; overweight  HEAD: atraumatic and normocephalic  EYES: no pallor, no icterus, PERRLA  ENT: OMM, no pharyngeal erythema, external ears WNL; no nasal discharge; no thrush  NECK: no masses, thyroid normal, trachea midline, no LAD/LN's, supple  CV: RRR with no murmur; normal pulse; normal S1 and S2; no pedal edema  CHEST: Normal respiratory effort; CTAB; normal breath sounds; no wheeze or crackles  ABDOM: nontender and nondistended; soft; normal bowel sounds; no rebound/guarding  MUSC/Skeletal: ROM normal; no crepitus; joints normal; no " deformities or arthropathy; she walks with use of walker; s/p right tibial surgery  EXTREM: no clubbing, cyanosis, inflammation or swelling  SKIN: no rashes, lesions, ulcers, petechiae or subcutaneous nodules  : no jo  NEURO: grossly intact; motor/sensory WNL; AAOx3; no tremors  PSYCH: normal mood, affect and behavior  LYMPH: normal cervical, supraclavicular, axillary and groin LN's              Labs:      Lab Results   Component Value Date    WBC 9.08 08/15/2024    HGB 11.1 (L) 08/15/2024    HCT 38.6 08/15/2024    MCV 81 (L) 08/15/2024     08/15/2024     CMP  Sodium   Date Value Ref Range Status   08/15/2024 141 136 - 145 mmol/L Final     Potassium   Date Value Ref Range Status   08/15/2024 4.5 3.5 - 5.1 mmol/L Final     Chloride   Date Value Ref Range Status   08/15/2024 110 95 - 110 mmol/L Final     CO2   Date Value Ref Range Status   08/15/2024 20 (L) 23 - 29 mmol/L Final     Glucose   Date Value Ref Range Status   08/15/2024 77 70 - 110 mg/dL Final     BUN   Date Value Ref Range Status   08/15/2024 40 (H) 8 - 23 mg/dL Final     Creatinine   Date Value Ref Range Status   08/15/2024 1.6 (H) 0.5 - 1.4 mg/dL Final     Calcium   Date Value Ref Range Status   08/15/2024 9.2 8.7 - 10.5 mg/dL Final     Total Protein   Date Value Ref Range Status   08/15/2024 7.7 6.0 - 8.4 g/dL Final     Albumin   Date Value Ref Range Status   08/15/2024 3.7 3.5 - 5.2 g/dL Final     Total Bilirubin   Date Value Ref Range Status   08/15/2024 0.3 0.1 - 1.0 mg/dL Final     Comment:     For infants and newborns, interpretation of results should be based  on gestational age, weight and in agreement with clinical  observations.    Premature Infant recommended reference ranges:  Up to 24 hours.............<8.0 mg/dL  Up to 48 hours............<12.0 mg/dL  3-5 days..................<15.0 mg/dL  6-29 days.................<15.0 mg/dL       Alkaline Phosphatase   Date Value Ref Range Status   08/15/2024 164 (H) 55 - 135 U/L Final      AST   Date Value Ref Range Status   08/15/2024 21 10 - 40 U/L Final     ALT   Date Value Ref Range Status   08/15/2024 13 10 - 44 U/L Final     Anion Gap   Date Value Ref Range Status   08/15/2024 11 8 - 16 mmol/L Final     eGFR if    Date Value Ref Range Status   05/09/2019 >60.0 >60 mL/min/1.73 m^2 Final     eGFR if non    Date Value Ref Range Status   05/09/2019 54.3 (A) >60 mL/min/1.73 m^2 Final     Comment:     Calculation used to obtain the estimated glomerular filtration  rate (eGFR) is the CKD-EPI equation.          Lab Results   Component Value Date    IRON 110 08/15/2024    TIBC 414 08/15/2024    FERRITIN 144 08/15/2024       Lab Results   Component Value Date    [id number] 687 06/15/2024     Lab Results   Component Value Date    FOLATE 5.9 07/26/2024     hgb electrophoresis pending      Radiology/Diagnostic Studies:    No results found.    I have reviewed all available lab results and radiology reports.    Assessment/Plan:   (1) 69 y.o. female  with diagnosis of chronic anemia with iron deficiency and folate deficiency referred by Dr Nova for hematologic evaluation  - chronic history of anemia requiring transfusions in past  - TIBC elevated  - microcytic indices  - bilirubin wnl so I do not suspect hemolysis at this time  - hgb currently at 11.9 and much better  - she required blood transfusion in Oct 2018  - ferritin is low at 18 but improved; iron panel is adequate  - she has been on ICAR-C    - she is to see GI Dr Rg in near future  - B12 and folate are adequate    7/5/2024:  - She had recent leg surgery and had post op bleeding and complications.   - She had not been taking her recommended iron or folate.   - started on ICAR-C, folate and set up with Injectafer x2  - she declined to go see GI    8/29/2024:  - she had 5 of the ordered 8 IV irons  - hgb at 11.1  - iron panel adequate  - continued on oral iron     (2) Hx/of pulmonary emboli and s/p IVC filter  in past - 2009     (3) HTN    (4) CKD     (5) Obesity     (6) Hx/of endometrial cancer - 2009 - s/p radiation but no chemotherapy; she does not f/u with a GYN    (7) Tibial fracture   - post surgery with Dr. Barragan               VISIT DIAGNOSES:      History of iron deficiency    Anemia, unspecified type    Hx pulmonary embolism    Presence of IVC filter    Microcytic anemia    Iron deficiency anemia due to chronic blood loss    Folic acid deficiency          PLAN:  Check labs every month incl iron panel  2. Encouraged follow up GI,   3. Continue oral iron and folate orally   4. Referral to nephrology was discussed previously  5. Follow up with ortho   RTC in  12 weeks  Fax note to Nelia Melo, ED , Hannah Rouse NP, [place]; [facility]    Discussion:     Iron Infusion Therapy Discussion:     Provided literature/learning materials on the particular IV iron regimen and discussed the potential side-effect profiles of the drug(s). Discussed the importance of compliance with obtaining and monitoring requested lab work, and went over the potential risk for the development of anaphylactic shock, bronchospasm, dysrhythmia, liver and/or kidney damage, and respiratory/cardiovascular arrest and/or failure. I discussed the potential risks for development of alopecia, fevers, itching, chills and/or rigors, cold sensory issues, ringing in ears, vertigo and neuropathy, all of which are usually acute but sometimes could end up being chronic and life-long. Discussed the risks of hand-foot syndrome and rashes, and development of other autoimmune mediated processes such as pneumonitis and colitis which could be life threatening.     The patient's consent has been obtained to proceed with the IV iron therapy. The patient will either be referred to [employer] /[facility] or one of the Hematology Clinic Nurses for training and education on IV iron therapy, use of antiemetics and/or anti-diarrheals, use of  NSAID's, potential IV iron therapy side-effects, and any specific recommendations and precautions with the particular IV iron agents.      Answered all of the patient's (and family's, if applicable) questions to the best of my ability and to their complete satisfaction. The patient acknowledged full understanding of the risks, recommendations and plan(s).     I spent over 25 mins of time with the patient. Reviewed results of the recently ordered labs, tests and studies; made directives with regards to the results. Over half of this time was spent couseling and coordinating care.    I have explained all of the above in detail and the patient understands all of the current recommendation(s). I have answered all of their questions to the best of my ability and to their complete satisfaction.   The patient is to continue with the current management plan.            Electronically signed by Jeremy Horne MD

## 2024-08-29 ENCOUNTER — OFFICE VISIT (OUTPATIENT)
Facility: CLINIC | Age: 69
End: 2024-08-29
Payer: MEDICARE

## 2024-08-29 VITALS
HEART RATE: 68 BPM | SYSTOLIC BLOOD PRESSURE: 153 MMHG | HEIGHT: 62 IN | RESPIRATION RATE: 16 BRPM | TEMPERATURE: 99 F | DIASTOLIC BLOOD PRESSURE: 66 MMHG | BODY MASS INDEX: 44.37 KG/M2

## 2024-08-29 DIAGNOSIS — Z86.39 HISTORY OF IRON DEFICIENCY: Primary | ICD-10-CM

## 2024-08-29 DIAGNOSIS — Z86.711 HX PULMONARY EMBOLISM: ICD-10-CM

## 2024-08-29 DIAGNOSIS — E53.8 FOLIC ACID DEFICIENCY: ICD-10-CM

## 2024-08-29 DIAGNOSIS — D50.0 IRON DEFICIENCY ANEMIA DUE TO CHRONIC BLOOD LOSS: ICD-10-CM

## 2024-08-29 DIAGNOSIS — D64.9 ANEMIA, UNSPECIFIED TYPE: ICD-10-CM

## 2024-08-29 DIAGNOSIS — D50.9 MICROCYTIC ANEMIA: ICD-10-CM

## 2024-08-29 DIAGNOSIS — Z95.828 PRESENCE OF IVC FILTER: ICD-10-CM

## 2024-08-29 PROCEDURE — 99999 PR PBB SHADOW E&M-EST. PATIENT-LVL III: CPT | Mod: PBBFAC,,, | Performed by: INTERNAL MEDICINE

## 2024-12-04 NOTE — PROGRESS NOTES
"[facility] Hematology/Oncology  PROGRESS NOTE -  Follow-up Visit      Subjective:       Patient ID:   NAME: Tami Herrera : 1955     69 y.o. female    Referring Doc: Jesusita Rouse NP  Other Physicians: Evangelina Rg,     Chief Complaint:  Anemia f/u    History of Present Illness:     Patient returns today for a regularly scheduled follow-up visit.  The patient is here today to go over the results of the recently ordered labs, tests and studies.  She is here by herself.      She is walking with use of cane today.     She reports that she otherwise feels ok. She denies any CP, SOB, HA's or N/V; energy levels down again    Right leg doing "good"    She has had IV iron in past but did not get the last two sessions this past summer                  ROS:   GEN: normal without any fever, night sweats or weight loss; right tibia fracture now good   HEENT: normal with no HA's, sore throat, stiff neck, changes in vision  CV: normal with no CP, SOB, PND, ESQUIVEL or orthopnea  PULM: normal with no SOB, cough, hemoptysis, sputum or pleuritic pain  GI: normal with no abdominal pain, nausea, vomiting, constipation, diarrhea, melanotic stools, BRBPR, or hematemesis  : normal with no hematuria, dysuria  BREAST: normal with no mass, discharge, pain  SKIN: normal with no rash, erythema, bruising, or swelling    Allergies:  Review of patient's allergies indicates:   Allergen Reactions    Amoxicillin Rash    Pcn [penicillins] Hives       Medications:    Current Outpatient Medications:     ibuprofen (ADVIL,MOTRIN) 800 MG tablet, Take 1 tablet (800 mg total) by mouth every 6 (six) hours as needed for Pain., Disp: 20 tablet, Rfl: 0    iron-vitamin C 100-250 mg, ICAR-C, 100-250 mg Tab, TAKE 1 TABLET BY MOUTH EVERY DAY, Disp: 30 tablet, Rfl: 0    iron-vitamin C 100-250 mg, ICAR-C, 100-250 mg Tab, Take 1 tablet by mouth Daily., Disp: 90 each, Rfl: 3    lactulose (CHRONULAC) 10 gram/15 mL solution, TK 15 MLS PO D CAN INCREASE TO 30 MLS IF NO " "RELIEF, Disp: , Rfl: 5    LORazepam (ATIVAN) 0.5 MG tablet, 1 po qd prn anxiety or may add to trazadoen prn sleep, Disp: 30 tablet, Rfl: 2    traZODone (DESYREL) 50 MG tablet, TAKE 1 TABLET(50 MG) BY MOUTH EVERY EVENING, Disp: 30 tablet, Rfl: 2    folic acid (FOLVITE) 1 MG tablet, Take 1 tablet (1 mg total) by mouth once daily., Disp: 100 tablet, Rfl: 3    losartan-hydrochlorothiazide 100-12.5 mg (HYZAAR) 100-12.5 mg Tab, Take 1 tablet by mouth once daily., Disp: 90 tablet, Rfl: 3    omeprazole (PRILOSEC) 20 MG capsule, Take 2 capsules (40 mg total) by mouth once daily., Disp: 60 capsule, Rfl: 11    oxyCODONE-acetaminophen (PERCOCET) 5-325 mg per tablet, Take 1 tablet by mouth every 6 (six) hours as needed for Pain., Disp: 12 tablet, Rfl: 0    PMHx/PSHx Updates:  See patient's last visit with me on 8/29/2024  See H&P on 12/12/2018        Pathology:  Cancer Staging  No matching staging information was found for the patient.          Objective:     Vitals:  Blood pressure (!) 182/99, pulse 84, temperature 98.1 °F (36.7 °C), temperature source Temporal, resp. rate 16, height 5' 2" (1.575 m), weight 112.9 kg (249 lb).    Physical Examination:   GEN: no apparent distress, comfortable; AAOx3; overweight  HEAD: atraumatic and normocephalic  EYES: no pallor, no icterus, PERRLA  ENT: OMM, no pharyngeal erythema, external ears WNL; no nasal discharge; no thrush  NECK: no masses, thyroid normal, trachea midline, no LAD/LN's, supple  CV: RRR with no murmur; normal pulse; normal S1 and S2; no pedal edema  CHEST: Normal respiratory effort; CTAB; normal breath sounds; no wheeze or crackles  ABDOM: nontender and nondistended; soft; normal bowel sounds; no rebound/guarding  MUSC/Skeletal: ROM normal; no crepitus; joints normal; no deformities or arthropathy; she walks with use of walker; s/p right tibial surgery  EXTREM: no clubbing, cyanosis, inflammation or swelling  SKIN: no rashes, lesions, ulcers, petechiae or subcutaneous " nodules  : no jo  NEURO: grossly intact; motor/sensory WNL; AAOx3; no tremors  PSYCH: normal mood, affect and behavior  LYMPH: normal cervical, supraclavicular, axillary and groin LN's              Labs:      Lab Results   Component Value Date    WBC 7.51 12/04/2024    HGB 12.1 12/04/2024    HCT 40.6 12/04/2024    MCV 85 12/04/2024     12/04/2024     CMP  Sodium   Date Value Ref Range Status   12/04/2024 140 136 - 145 mmol/L Final     Potassium   Date Value Ref Range Status   12/04/2024 4.5 3.5 - 5.1 mmol/L Final     Chloride   Date Value Ref Range Status   12/04/2024 108 95 - 110 mmol/L Final     CO2   Date Value Ref Range Status   12/04/2024 22 (L) 23 - 29 mmol/L Final     Glucose   Date Value Ref Range Status   12/04/2024 92 70 - 110 mg/dL Final     BUN   Date Value Ref Range Status   12/04/2024 45 (H) 8 - 23 mg/dL Final     Creatinine   Date Value Ref Range Status   12/04/2024 1.7 (H) 0.5 - 1.4 mg/dL Final     Calcium   Date Value Ref Range Status   12/04/2024 9.3 8.7 - 10.5 mg/dL Final     Total Protein   Date Value Ref Range Status   12/04/2024 7.4 6.0 - 8.4 g/dL Final     Albumin   Date Value Ref Range Status   12/04/2024 3.6 3.5 - 5.2 g/dL Final     Total Bilirubin   Date Value Ref Range Status   12/04/2024 0.3 0.1 - 1.0 mg/dL Final     Comment:     For infants and newborns, interpretation of results should be based  on gestational age, weight and in agreement with clinical  observations.    Premature Infant recommended reference ranges:  Up to 24 hours.............<8.0 mg/dL  Up to 48 hours............<12.0 mg/dL  3-5 days..................<15.0 mg/dL  6-29 days.................<15.0 mg/dL       Alkaline Phosphatase   Date Value Ref Range Status   12/04/2024 143 40 - 150 U/L Final     AST   Date Value Ref Range Status   12/04/2024 19 10 - 40 U/L Final     ALT   Date Value Ref Range Status   12/04/2024 14 10 - 44 U/L Final     Anion Gap   Date Value Ref Range Status   12/04/2024 10 8 - 16 mmol/L  Final     eGFR if    Date Value Ref Range Status   05/09/2019 >60.0 >60 mL/min/1.73 m^2 Final     eGFR if non    Date Value Ref Range Status   05/09/2019 54.3 (A) >60 mL/min/1.73 m^2 Final     Comment:     Calculation used to obtain the estimated glomerular filtration  rate (eGFR) is the CKD-EPI equation.          Lab Results   Component Value Date    IRON 62 12/04/2024    TIBC 391 12/04/2024    FERRITIN 34 12/04/2024     Saturated Iron 20 - 50 % 16       Lab Results   Component Value Date    [id number] 687 06/15/2024     Lab Results   Component Value Date    FOLATE 5.9 07/26/2024     hgb electrophoresis pending      Radiology/Diagnostic Studies:    No results found.    I have reviewed all available lab results and radiology reports.    Assessment/Plan:   (1) 69 y.o. female  with diagnosis of chronic anemia with iron deficiency and folate deficiency referred by Dr Nova for hematologic evaluation  - chronic history of anemia requiring transfusions in past  - TIBC elevated  - microcytic indices  - bilirubin wnl so I do not suspect hemolysis at this time  - hgb currently at 11.9 and much better  - she required blood transfusion in Oct 2018  - ferritin is low at 18 but improved; iron panel is adequate  - she has been on ICAR-C    - she is to see GI Dr Rg in near future  - B12 and folate are adequate    7/5/2024:  - She had recent leg surgery and had post op bleeding and complications.   - She had not been taking her recommended iron or folate.   - started on ICAR-C, folate and set up with Injectafer x2  - she declined to go see GI    8/29/2024:  - she had 5 of the ordered 8 IV irons  - hgb at 11.1  - iron panel adequate  - continued on oral iron    12/5/2024:  - latest hgb WNL  - iron and ferritin are currently WNL  - iron sata  sophy lwer at 16%  - she plans to continue oral iron at double dose     (2) Hx/of pulmonary emboli and s/p IVC filter in past - 2009     (3) HTN    (4)  CKD     (5) Obesity     (6) Hx/of endometrial cancer - 2009 - s/p radiation but no chemotherapy; she does not f/u with a GYN    (7) Tibial fracture   - post surgery with Dr. Barragan               VISIT DIAGNOSES:      History of iron deficiency    Anemia, unspecified type    Hx pulmonary embolism    Presence of IVC filter    Iron deficiency anemia due to chronic blood loss    Microcytic anemia    Folic acid deficiency          PLAN:  Check labs every month incl iron panel  2. Encouraged follow up GI,   3. Continue oral iron and folate orally   4. Recommended and encourage her to go see nephrology was discussed previously     RTC in  16 weeks  Fax note to   Hannah Rouse NP, [place]; [facility]    Discussion:     Iron Infusion Therapy Discussion:     Provided literature/learning materials on the particular IV iron regimen and discussed the potential side-effect profiles of the drug(s). Discussed the importance of compliance with obtaining and monitoring requested lab work, and went over the potential risk for the development of anaphylactic shock, bronchospasm, dysrhythmia, liver and/or kidney damage, and respiratory/cardiovascular arrest and/or failure. I discussed the potential risks for development of alopecia, fevers, itching, chills and/or rigors, cold sensory issues, ringing in ears, vertigo and neuropathy, all of which are usually acute but sometimes could end up being chronic and life-long. Discussed the risks of hand-foot syndrome and rashes, and development of other autoimmune mediated processes such as pneumonitis and colitis which could be life threatening.     The patient's consent has been obtained to proceed with the IV iron therapy. The patient will either be referred to [facility] or one of the Hematology Clinic Nurses for training and education on IV iron therapy, use of antiemetics and/or anti-diarrheals, use of NSAID's, potential IV iron therapy side-effects, and any  specific recommendations and precautions with the particular IV iron agents.      Answered all of the patient's (and family's, if applicable) questions to the best of my ability and to their complete satisfaction. The patient acknowledged full understanding of the risks, recommendations and plan(s).     I spent over 25 mins of time with the patient. Reviewed results of the recently ordered labs, tests and studies; made directives with regards to the results. Over half of this time was spent couseling and coordinating care.    I have explained all of the above in detail and the patient understands all of the current recommendation(s). I have answered all of their questions to the best of my ability and to their complete satisfaction.   The patient is to continue with the current management plan.            Electronically signed by Jeremy Horne MD

## 2024-12-05 ENCOUNTER — OFFICE VISIT (OUTPATIENT)
Facility: CLINIC | Age: 69
End: 2024-12-05
Payer: MEDICARE

## 2024-12-05 VITALS
BODY MASS INDEX: 45.82 KG/M2 | HEART RATE: 84 BPM | SYSTOLIC BLOOD PRESSURE: 182 MMHG | HEIGHT: 62 IN | TEMPERATURE: 98 F | RESPIRATION RATE: 16 BRPM | WEIGHT: 249 LBS | DIASTOLIC BLOOD PRESSURE: 99 MMHG

## 2024-12-05 DIAGNOSIS — Z86.711 HX PULMONARY EMBOLISM: ICD-10-CM

## 2024-12-05 DIAGNOSIS — E53.8 FOLIC ACID DEFICIENCY: ICD-10-CM

## 2024-12-05 DIAGNOSIS — D50.9 MICROCYTIC ANEMIA: ICD-10-CM

## 2024-12-05 DIAGNOSIS — D64.9 ANEMIA, UNSPECIFIED TYPE: ICD-10-CM

## 2024-12-05 DIAGNOSIS — D50.0 IRON DEFICIENCY ANEMIA DUE TO CHRONIC BLOOD LOSS: ICD-10-CM

## 2024-12-05 DIAGNOSIS — Z95.828 PRESENCE OF IVC FILTER: ICD-10-CM

## 2024-12-05 DIAGNOSIS — Z86.39 HISTORY OF IRON DEFICIENCY: Primary | ICD-10-CM

## 2024-12-05 PROCEDURE — 99999 PR PBB SHADOW E&M-EST. PATIENT-LVL III: CPT | Mod: PBBFAC,,, | Performed by: INTERNAL MEDICINE

## 2025-04-10 ENCOUNTER — TELEPHONE (OUTPATIENT)
Facility: CLINIC | Age: 70
End: 2025-04-10

## 2025-04-10 ENCOUNTER — OFFICE VISIT (OUTPATIENT)
Facility: CLINIC | Age: 70
End: 2025-04-10
Payer: MEDICARE

## 2025-04-10 VITALS
OXYGEN SATURATION: 97 % | HEIGHT: 62 IN | WEIGHT: 236.81 LBS | BODY MASS INDEX: 43.58 KG/M2 | RESPIRATION RATE: 18 BRPM | TEMPERATURE: 98 F | HEART RATE: 69 BPM

## 2025-04-10 DIAGNOSIS — D52.0 DIETARY FOLATE DEFICIENCY ANEMIA: ICD-10-CM

## 2025-04-10 DIAGNOSIS — Z95.828 PRESENCE OF IVC FILTER: ICD-10-CM

## 2025-04-10 DIAGNOSIS — Z86.39 HISTORY OF IRON DEFICIENCY: ICD-10-CM

## 2025-04-10 DIAGNOSIS — Z86.711 HX PULMONARY EMBOLISM: ICD-10-CM

## 2025-04-10 DIAGNOSIS — D64.9 ANEMIA, UNSPECIFIED TYPE: Primary | ICD-10-CM

## 2025-04-10 DIAGNOSIS — E53.8 FOLIC ACID DEFICIENCY: ICD-10-CM

## 2025-04-10 DIAGNOSIS — D50.9 MICROCYTIC ANEMIA: ICD-10-CM

## 2025-04-10 DIAGNOSIS — D50.0 IRON DEFICIENCY ANEMIA DUE TO CHRONIC BLOOD LOSS: ICD-10-CM

## 2025-04-10 DIAGNOSIS — D53.9 NUTRITIONAL ANEMIA, UNSPECIFIED: ICD-10-CM

## 2025-04-10 PROCEDURE — 99999 PR PBB SHADOW E&M-EST. PATIENT-LVL III: CPT | Mod: PBBFAC,,, | Performed by: INTERNAL MEDICINE

## 2025-04-10 RX ORDER — GABAPENTIN 600 MG/1
600 TABLET ORAL 3 TIMES DAILY
COMMUNITY

## 2025-04-10 NOTE — PROGRESS NOTES
[facility] Hematology/Oncology  PROGRESS NOTE -  Follow-up Visit      Subjective:       Patient ID:   NAME: Tami Herrera : 1955     69 y.o. female    Referring Doc: Jesusita Rouse NP  Other Physicians: Evangelina Rg,     Chief Complaint:  Anemia f/u    History of Present Illness:     Patient returns today for a regularly scheduled follow-up visit.  The patient is here today to go over the results of the recently ordered labs, tests and studies.  She is here with her brother Shashi.      She is in wheelchair today; she fell and busted her left knee    She reports that she otherwise feels ok. She denies any CP, SOB, HA's or N/V; energy levels are currently ok                       ROS:   GEN: normal without any fever, night sweats or weight loss; busted her left knee - some residual bruising  HEENT: normal with no HA's, sore throat, stiff neck, changes in vision  CV: normal with no CP, SOB, PND, ESQUIVEL or orthopnea  PULM: normal with no SOB, cough, hemoptysis, sputum or pleuritic pain  GI: normal with no abdominal pain, nausea, vomiting, constipation, diarrhea, melanotic stools, BRBPR, or hematemesis  : normal with no hematuria, dysuria  BREAST: normal with no mass, discharge, pain  SKIN: normal with no rash, erythema, bruising, or swelling    Allergies:  Review of patient's allergies indicates:   Allergen Reactions    Amoxicillin Rash    Pcn [penicillins] Hives       Medications:    Current Outpatient Medications:     folic acid (FOLVITE) 1 MG tablet, Take 1 tablet (1 mg total) by mouth once daily., Disp: 100 tablet, Rfl: 3    ibuprofen (ADVIL,MOTRIN) 800 MG tablet, Take 1 tablet (800 mg total) by mouth every 6 (six) hours as needed for Pain., Disp: 20 tablet, Rfl: 0    iron-vitamin C 100-250 mg, ICAR-C, 100-250 mg Tab, TAKE 1 TABLET BY MOUTH EVERY DAY, Disp: 30 tablet, Rfl: 0    omeprazole (PRILOSEC) 20 MG capsule, Take 2 capsules (40 mg total) by mouth once daily., Disp: 60 capsule, Rfl: 11    traZODone (DESYREL)  "50 MG tablet, TAKE 1 TABLET(50 MG) BY MOUTH EVERY EVENING, Disp: 30 tablet, Rfl: 2    gabapentin (NEURONTIN) 600 MG tablet, Take 600 mg by mouth 3 (three) times daily., Disp: , Rfl:     iron-vitamin C 100-250 mg, ICAR-C, 100-250 mg Tab, Take 1 tablet by mouth Daily. (Patient not taking: Reported on 4/10/2025), Disp: 90 each, Rfl: 3    lactulose (CHRONULAC) 10 gram/15 mL solution, TK 15 MLS PO D CAN INCREASE TO 30 MLS IF NO RELIEF (Patient not taking: Reported on 4/10/2025), Disp: , Rfl: 5    LORazepam (ATIVAN) 0.5 MG tablet, 1 po qd prn anxiety or may add to trazadoen prn sleep (Patient not taking: Reported on 4/10/2025), Disp: 30 tablet, Rfl: 2    losartan-hydrochlorothiazide 100-12.5 mg (HYZAAR) 100-12.5 mg Tab, Take 1 tablet by mouth once daily. (Patient not taking: Reported on 4/10/2025), Disp: 90 tablet, Rfl: 3    oxyCODONE-acetaminophen (PERCOCET) 5-325 mg per tablet, Take 1 tablet by mouth every 6 (six) hours as needed for Pain., Disp: 12 tablet, Rfl: 0    PMHx/PSHx Updates:  See patient's last visit with me on 12/5/2024  See H&P on 12/12/2018        Pathology:  Cancer Staging  No matching staging information was found for the patient.          Objective:     Vitals:  Blood pressure (P) 129/60, pulse 69, temperature 97.9 °F (36.6 °C), temperature source Temporal, resp. rate 18, height 5' 2" (1.575 m), weight 107.4 kg (236 lb 12.8 oz), SpO2 97%.    Physical Examination:   GEN: no apparent distress, comfortable; AAOx3; overweight  HEAD: atraumatic and normocephalic  EYES: no pallor, no icterus, PERRLA  ENT: OMM, no pharyngeal erythema, external ears WNL; no nasal discharge; no thrush  NECK: no masses, thyroid normal, trachea midline, no LAD/LN's, supple  CV: RRR with no murmur; normal pulse; normal S1 and S2; no pedal edema  CHEST: Normal respiratory effort; CTAB; normal breath sounds; no wheeze or crackles  ABDOM: nontender and nondistended; soft; normal bowel sounds; no rebound/guarding  MUSC/Skeletal: ROM " normal; no crepitus; joints normal; no deformities or arthropathy; swheelchair today; s/p right tibial surgery  EXTREM: no clubbing, cyanosis, inflammation or swelling  SKIN: no rashes, lesions, ulcers, petechiae or subcutaneous nodules  : no jo  NEURO: grossly intact; motor/sensory WNL; AAOx3; no tremors  PSYCH: normal mood, affect and behavior  LYMPH: normal cervical, supraclavicular, axillary and groin LN's              Labs:      Lab Results   Component Value Date    WBC 9.84 04/07/2025    HGB 12.2 04/07/2025    HCT 39.2 04/07/2025    MCV 89 04/07/2025     04/07/2025     CMP  Sodium   Date Value Ref Range Status   04/07/2025 142 136 - 145 mmol/L Final   12/04/2024 140 136 - 145 mmol/L Final     Potassium   Date Value Ref Range Status   04/07/2025 4.1 3.5 - 5.1 mmol/L Final   12/04/2024 4.5 3.5 - 5.1 mmol/L Final     Chloride   Date Value Ref Range Status   04/07/2025 112 (H) 95 - 110 mmol/L Final   12/04/2024 108 95 - 110 mmol/L Final     CO2   Date Value Ref Range Status   04/07/2025 20 (L) 23 - 29 mmol/L Final   12/04/2024 22 (L) 23 - 29 mmol/L Final     Glucose   Date Value Ref Range Status   12/04/2024 92 70 - 110 mg/dL Final     BUN   Date Value Ref Range Status   04/07/2025 44 (H) 8 - 23 mg/dL Final     Creatinine   Date Value Ref Range Status   04/07/2025 1.4 0.5 - 1.4 mg/dL Final     Calcium   Date Value Ref Range Status   04/07/2025 8.7 8.7 - 10.5 mg/dL Final   12/04/2024 9.3 8.7 - 10.5 mg/dL Final     Total Protein   Date Value Ref Range Status   12/04/2024 7.4 6.0 - 8.4 g/dL Final     Albumin   Date Value Ref Range Status   04/07/2025 3.7 3.5 - 5.2 g/dL Final   12/04/2024 3.6 3.5 - 5.2 g/dL Final     Total Bilirubin   Date Value Ref Range Status   12/04/2024 0.3 0.1 - 1.0 mg/dL Final     Comment:     For infants and newborns, interpretation of results should be based  on gestational age, weight and in agreement with clinical  observations.    Premature Infant recommended reference  ranges:  Up to 24 hours.............<8.0 mg/dL  Up to 48 hours............<12.0 mg/dL  3-5 days..................<15.0 mg/dL  6-29 days.................<15.0 mg/dL       Bilirubin Total   Date Value Ref Range Status   04/07/2025 0.3 0.1 - 1.0 mg/dL Final     Comment:     For infants and newborns, interpretation of results should be based   on gestational age, weight and in agreement with clinical   observations.    Premature Infant recommended reference ranges:   0-24 hours:  <8.0 mg/dL   24-48 hours: <12.0 mg/dL   3-5 days:    <15.0 mg/dL   6-29 days:   <15.0 mg/dL     Alkaline Phosphatase   Date Value Ref Range Status   12/04/2024 143 40 - 150 U/L Final     ALP   Date Value Ref Range Status   04/07/2025 125 40 - 150 unit/L Final     AST   Date Value Ref Range Status   04/07/2025 14 11 - 45 unit/L Final   12/04/2024 19 10 - 40 U/L Final     ALT   Date Value Ref Range Status   04/07/2025 14 10 - 44 unit/L Final   12/04/2024 14 10 - 44 U/L Final     Anion Gap   Date Value Ref Range Status   04/07/2025 10 8 - 16 mmol/L Final     eGFR if    Date Value Ref Range Status   05/09/2019 >60.0 >60 mL/min/1.73 m^2 Final     eGFR if non    Date Value Ref Range Status   05/09/2019 54.3 (A) >60 mL/min/1.73 m^2 Final     Comment:     Calculation used to obtain the estimated glomerular filtration  rate (eGFR) is the CKD-EPI equation.          Lab Results   Component Value Date    IRON 70 04/07/2025    TIBC 342 04/07/2025    FERRITIN 48.0 04/07/2025         Lab Results   Component Value Date    [id number] 06/15/2024     Lab Results   Component Value Date    FOLATE 5.9 07/26/2024           Radiology/Diagnostic Studies:    No results found.    I have reviewed all available lab results and radiology reports.    Assessment/Plan:   (1) 69 y.o. female  with diagnosis of chronic anemia with iron deficiency and folate deficiency referred by Dr Nova for hematologic evaluation  - chronic history of  anemia requiring transfusions in past  - TIBC elevated  - microcytic indices  - bilirubin wnl so I do not suspect hemolysis at this time  - hgb currently at 11.9 and much better  - she required blood transfusion in Oct 2018  - ferritin is low at 18 but improved; iron panel is adequate  - she has been on ICAR-C    - she is to see GI Dr Rg in near future  - B12 and folate are adequate    7/5/2024:  - She had recent leg surgery and had post op bleeding and complications.   - She had not been taking her recommended iron or folate.   - started on ICAR-C, folate and set up with Injectafer x2  - she declined to go see GI    8/29/2024:  - she had 5 of the ordered 8 IV irons  - hgb at 11.1  - iron panel adequate  - continued on oral iron    12/5/2024:  - latest hgb WNL  - iron and ferritin are currently WNL  - iron sata  little lwer at 16%  - she plans to continue oral iron at double dose    4/10/2025:  - she continues with oral iron but not consistently  - latest CBc adequate with hgb wnl  - iron panel adequate     (2) Hx/of pulmonary emboli and s/p IVC filter in past - 2009     (3) HTN    (4) CKD     (5) Obesity     (6) Hx/of endometrial cancer - 2009 - s/p radiation but no chemotherapy; she does not f/u with a GYN    (7) Tibial fracture   - post surgery with Dr. Barragan               VISIT DIAGNOSES:      Anemia, unspecified type    Microcytic anemia    Iron deficiency anemia due to chronic blood loss    Presence of IVC filter    Hx pulmonary embolism    Folic acid deficiency    History of iron deficiency          PLAN:  Check labs every 3 months incl iron panel  2. Encouraged follow up GI,   3. Continue oral iron and folate orally   4. Recommended and encourage her to go see nephrology was discussed previously     RTC in  6 months  Fax note to   Hannah Rouse NP, [place]; [facility]    Discussion:     Iron Infusion Therapy Discussion:     Provided literature/learning materials on the particular IV iron regimen and  discussed the potential side-effect profiles of the drug(s). Discussed the importance of compliance with obtaining and monitoring requested lab work, and went over the potential risk for the development of anaphylactic shock, bronchospasm, dysrhythmia, liver and/or kidney damage, and respiratory/cardiovascular arrest and/or failure. I discussed the potential risks for development of alopecia, fevers, itching, chills and/or rigors, cold sensory issues, ringing in ears, vertigo and neuropathy, all of which are usually acute but sometimes could end up being chronic and life-long. Discussed the risks of hand-foot syndrome and rashes, and development of other autoimmune mediated processes such as pneumonitis and colitis which could be life threatening.     The patient's consent has been obtained to proceed with the IV iron therapy. The patient will either be referred to [facility] or one of the Hematology Clinic Nurses for training and education on IV iron therapy, use of antiemetics and/or anti-diarrheals, use of NSAID's, potential IV iron therapy side-effects, and any specific recommendations and precautions with the particular IV iron agents.      Answered all of the patient's (and family's, if applicable) questions to the best of my ability and to their complete satisfaction. The patient acknowledged full understanding of the risks, recommendations and plan(s).     I spent over 25 mins of time with the patient. Reviewed results of the recently ordered labs, tests and studies; made directives with regards to the results. Over half of this time was spent couseling and coordinating care.    I have explained all of the above in detail and the patient understands all of the current recommendation(s). I have answered all of their questions to the best of my ability and to their complete satisfaction.   The patient is to continue with the current management plan.            Electronically signed  by Jeremy Horne MD

## 2025-04-10 NOTE — TELEPHONE ENCOUNTER
----- Message from [employer] sent at 4/9/2025 11:43 AM CDT -----    Patient Returning CallWho Called:ptDoes the patient know what this is regarding?:need nurse to call pt about lab results Would the patient rather a call back or a response via [facility]? [facility] Call Back Number:[phone] Information: call back